# Patient Record
Sex: FEMALE | Race: WHITE | NOT HISPANIC OR LATINO | URBAN - METROPOLITAN AREA
[De-identification: names, ages, dates, MRNs, and addresses within clinical notes are randomized per-mention and may not be internally consistent; named-entity substitution may affect disease eponyms.]

---

## 2017-07-25 ENCOUNTER — APPOINTMENT (OUTPATIENT)
Dept: URBAN - METROPOLITAN AREA CLINIC 219 | Age: 77
Setting detail: DERMATOLOGY
End: 2017-07-31

## 2017-07-25 DIAGNOSIS — L30.1 DYSHIDROSIS [POMPHOLYX]: ICD-10-CM

## 2017-07-25 DIAGNOSIS — Z80.8 FAMILY HISTORY OF MALIGNANT NEOPLASM OF OTHER ORGANS OR SYSTEMS: ICD-10-CM

## 2017-07-25 DIAGNOSIS — L30.4 ERYTHEMA INTERTRIGO: ICD-10-CM

## 2017-07-25 DIAGNOSIS — L82.1 OTHER SEBORRHEIC KERATOSIS: ICD-10-CM

## 2017-07-25 DIAGNOSIS — L57.0 ACTINIC KERATOSIS: ICD-10-CM

## 2017-07-25 PROBLEM — D48.5 NEOPLASM OF UNCERTAIN BEHAVIOR OF SKIN: Status: ACTIVE | Noted: 2017-07-25

## 2017-07-25 PROBLEM — L85.3 XEROSIS CUTIS: Status: ACTIVE | Noted: 2017-07-25

## 2017-07-25 PROCEDURE — OTHER TREATMENT REGIMEN: OTHER

## 2017-07-25 PROCEDURE — OTHER REASSURANCE: OTHER

## 2017-07-25 PROCEDURE — OTHER LIQUID NITROGEN: OTHER

## 2017-07-25 PROCEDURE — OTHER COUNSELING: OTHER

## 2017-07-25 PROCEDURE — OTHER BIOPSY BY SHAVE METHOD: OTHER

## 2017-07-25 PROCEDURE — 11100: CPT | Mod: 59

## 2017-07-25 PROCEDURE — 99213 OFFICE O/P EST LOW 20 MIN: CPT | Mod: 25

## 2017-07-25 PROCEDURE — 17000 DESTRUCT PREMALG LESION: CPT

## 2017-07-25 ASSESSMENT — LOCATION SIMPLE DESCRIPTION DERM
LOCATION SIMPLE: LEFT PRETIBIAL REGION
LOCATION SIMPLE: RIGHT TEMPLE

## 2017-07-25 ASSESSMENT — LOCATION DETAILED DESCRIPTION DERM
LOCATION DETAILED: RIGHT MEDIAL TEMPLE
LOCATION DETAILED: LEFT PROXIMAL PRETIBIAL REGION

## 2017-07-25 ASSESSMENT — LOCATION ZONE DERM
LOCATION ZONE: FACE
LOCATION ZONE: LEG

## 2017-07-25 NOTE — PROCEDURE: LIQUID NITROGEN
Post-Care Instructions: I reviewed with the patient in detail post-care instructions. Patient is to wear sunprotection, and avoid picking at any of the treated lesions. Pt may apply Vaseline to crusted or scabbing areas.
Consent: The patient's consent was obtained including but not limited to risks of crusting, scabbing, blistering, scarring, darker or lighter pigmentary change, recurrence, incomplete removal and infection.
Detail Level: Detailed
Number Of Freeze-Thaw Cycles: 1 freeze-thaw cycle
Duration Of Freeze Thaw-Cycle (Seconds): 0
Render Post-Care Instructions In Note?: no

## 2017-07-25 NOTE — PROCEDURE: BIOPSY BY SHAVE METHOD
Anesthesia Volume In Cc (Will Not Render If 0): 1
Post-Care Instructions: I reviewed with the patient in detail post-care instructions. Patient is to keep the biopsy site dry overnight, and then apply bacitracin twice daily until healed. Patient may apply hydrogen peroxide soaks to remove any crusting.
Electrodesiccation And Curettage Text: The wound bed was treated with electrodesiccation and curettage after the biopsy was performed.
Destruction After The Procedure: No
Silver Nitrate Text: The wound bed was treated with silver nitrate after the biopsy was performed.
Hemostasis: Aluminum Chloride
Consent: Written consent was obtained and risks were reviewed including but not limited to scarring, infection, bleeding, scabbing, incomplete removal, nerve damage and allergy to anesthesia.
Detail Level: Detailed
Body Location Override (Optional - Billing Will Still Be Based On Selected Body Map Location If Applicable): right posterior shoulder
Type Of Destruction Used: Curettage
Anesthesia Type: 1% lidocaine with epinephrine and a 1:10 solution of 8.4% sodium bicarbonate
Size Of Lesion In Cm: 1.2
Biopsy Method: Personna blade
Curettage Text: The wound bed was treated with curettage after the biopsy was performed.
X Size Of Lesion In Cm: 0
Biopsy Type: H and E
Cryotherapy Text: The wound bed was treated with cryotherapy after the biopsy was performed.
Electrodesiccation Text: The wound bed was treated with electrodesiccation after the biopsy was performed.
Anticipated Plan (Based On Presumed Biopsy Results): If Basal Cell Carcinoma+ plan [id number]
Dressing: pressure dressing with telfa
Billing Type: Third-Party Bill
Notification Instructions: Patient will be notified of biopsy results. However, patient instructed to call the office if not contacted within 2 weeks.
Wound Care: Polysporin ointment

## 2017-07-25 NOTE — PROCEDURE: TREATMENT REGIMEN
Detail Level: Detailed
Continue Regimen: Clobetasol Cream twice a day as needed for flares\\nEmollients \\nMild Cleansers\\nO'Keefes Working Hands three times a day\\nNexcare Crack Solution as needed
Continue Regimen: Ciclopirox TS twice a day as needed \\nZeasorb powder\\nDrying technique

## 2017-10-30 ENCOUNTER — APPOINTMENT (OUTPATIENT)
Dept: URBAN - METROPOLITAN AREA CLINIC 219 | Age: 77
Setting detail: DERMATOLOGY
End: 2017-10-30

## 2017-10-30 DIAGNOSIS — L30.1 DYSHIDROSIS [POMPHOLYX]: ICD-10-CM

## 2017-10-30 DIAGNOSIS — L82.1 OTHER SEBORRHEIC KERATOSIS: ICD-10-CM

## 2017-10-30 DIAGNOSIS — L30.4 ERYTHEMA INTERTRIGO: ICD-10-CM

## 2017-10-30 DIAGNOSIS — R23.3 SPONTANEOUS ECCHYMOSES: ICD-10-CM

## 2017-10-30 DIAGNOSIS — L44.8 OTHER SPECIFIED PAPULOSQUAMOUS DISORDERS: ICD-10-CM

## 2017-10-30 DIAGNOSIS — L73.8 OTHER SPECIFIED FOLLICULAR DISORDERS: ICD-10-CM

## 2017-10-30 DIAGNOSIS — L29.8 OTHER PRURITUS: ICD-10-CM

## 2017-10-30 DIAGNOSIS — Z80.8 FAMILY HISTORY OF MALIGNANT NEOPLASM OF OTHER ORGANS OR SYSTEMS: ICD-10-CM

## 2017-10-30 PROBLEM — I10 ESSENTIAL (PRIMARY) HYPERTENSION: Status: ACTIVE | Noted: 2017-10-30

## 2017-10-30 PROBLEM — L20.84 INTRINSIC (ALLERGIC) ECZEMA: Status: ACTIVE | Noted: 2017-10-30

## 2017-10-30 PROBLEM — M12.9 ARTHROPATHY, UNSPECIFIED: Status: ACTIVE | Noted: 2017-10-30

## 2017-10-30 PROCEDURE — OTHER MIPS QUALITY: OTHER

## 2017-10-30 PROCEDURE — OTHER COUNSELING: OTHER

## 2017-10-30 PROCEDURE — OTHER TREATMENT REGIMEN: OTHER

## 2017-10-30 PROCEDURE — 99213 OFFICE O/P EST LOW 20 MIN: CPT

## 2017-10-30 PROCEDURE — OTHER REASSURANCE: OTHER

## 2017-10-30 ASSESSMENT — LOCATION DETAILED DESCRIPTION DERM
LOCATION DETAILED: LEFT INFERIOR MEDIAL FOREHEAD
LOCATION DETAILED: RIGHT PROXIMAL PRETIBIAL REGION
LOCATION DETAILED: RIGHT PROXIMAL RADIAL DORSAL FOREARM
LOCATION DETAILED: LEFT PROXIMAL RADIAL DORSAL FOREARM
LOCATION DETAILED: RIGHT MEDIAL TEMPLE
LOCATION DETAILED: RIGHT POSTERIOR SHOULDER

## 2017-10-30 ASSESSMENT — LOCATION SIMPLE DESCRIPTION DERM
LOCATION SIMPLE: RIGHT TEMPLE
LOCATION SIMPLE: LEFT FOREARM
LOCATION SIMPLE: RIGHT SHOULDER
LOCATION SIMPLE: LEFT FOREHEAD
LOCATION SIMPLE: RIGHT PRETIBIAL REGION
LOCATION SIMPLE: RIGHT FOREARM

## 2017-10-30 ASSESSMENT — LOCATION ZONE DERM
LOCATION ZONE: ARM
LOCATION ZONE: LEG
LOCATION ZONE: FACE

## 2017-10-30 NOTE — PROCEDURE: TREATMENT REGIMEN
Plan: OTC Sarna Lotion as needed for itching
Detail Level: Detailed
Detail Level: Simple
Continue Regimen: Clobetasol Cream twice a day as needed for flares\\nEmollients \\nMild Cleansers\\nO'Keefes Working Hands three times a day\\nNexcare Crack Solution as needed
Continue Regimen: Ciclopirox TS twice a day as needed \\nZeasorb powder\\nDrying technique

## 2018-04-24 ENCOUNTER — APPOINTMENT (OUTPATIENT)
Dept: URBAN - METROPOLITAN AREA CLINIC 219 | Age: 78
Setting detail: DERMATOLOGY
End: 2018-04-29

## 2018-04-24 DIAGNOSIS — L30.8 OTHER SPECIFIED DERMATITIS: ICD-10-CM

## 2018-04-24 PROBLEM — L30.9 DERMATITIS, UNSPECIFIED: Status: ACTIVE | Noted: 2018-04-24

## 2018-04-24 PROCEDURE — OTHER PRESCRIPTION: OTHER

## 2018-04-24 PROCEDURE — 11100: CPT

## 2018-04-24 PROCEDURE — OTHER BIOPSY BY PUNCH METHOD: OTHER

## 2018-04-24 PROCEDURE — 99214 OFFICE O/P EST MOD 30 MIN: CPT | Mod: 25

## 2018-04-24 PROCEDURE — OTHER TREATMENT REGIMEN: OTHER

## 2018-04-24 RX ORDER — PREDNISONE 10 MG/1
1 TABLET ORAL AS DIRECTED
Qty: 35 | Refills: 0 | Status: ERX

## 2018-04-24 RX ORDER — FEXOFENADINE 180 MG/1
1 TABLET, FILM COATED ORAL QAM
Qty: 30 | Refills: 3 | Status: ERX

## 2018-04-24 RX ORDER — LEVOCETIRIZINE DIHYDROCHLORIDE 5 MG/1
1 TABLET, FILM COATED ORAL EVERY NIGHT
Qty: 30 | Refills: 3 | Status: ERX

## 2018-04-24 RX ORDER — TRIAMCINOLONE ACETONIDE 1 MG/G
APPLY CREAM TOPICAL TWICE A DAY
Qty: 1 | Refills: 2 | Status: ERX

## 2018-04-24 RX ORDER — MUPIROCIN 20 MG/G
APPLY OINTMENT TOPICAL AS NEEDED
Qty: 1 | Refills: 5 | Status: ERX

## 2018-04-24 ASSESSMENT — LOCATION SIMPLE DESCRIPTION DERM
LOCATION SIMPLE: LEFT FOREARM
LOCATION SIMPLE: RIGHT THIGH
LOCATION SIMPLE: RIGHT PRETIBIAL REGION
LOCATION SIMPLE: ABDOMEN
LOCATION SIMPLE: LEFT THIGH
LOCATION SIMPLE: RIGHT FOREARM
LOCATION SIMPLE: LEFT PRETIBIAL REGION

## 2018-04-24 ASSESSMENT — LOCATION DETAILED DESCRIPTION DERM
LOCATION DETAILED: PERIUMBILICAL SKIN
LOCATION DETAILED: RIGHT PROXIMAL PRETIBIAL REGION
LOCATION DETAILED: RIGHT VENTRAL PROXIMAL FOREARM
LOCATION DETAILED: RIGHT ANTERIOR PROXIMAL THIGH
LOCATION DETAILED: LEFT VENTRAL PROXIMAL FOREARM
LOCATION DETAILED: LEFT PROXIMAL PRETIBIAL REGION
LOCATION DETAILED: LEFT ANTERIOR PROXIMAL THIGH

## 2018-04-24 ASSESSMENT — LOCATION ZONE DERM
LOCATION ZONE: TRUNK
LOCATION ZONE: ARM
LOCATION ZONE: LEG

## 2018-04-24 NOTE — PROCEDURE: BIOPSY BY PUNCH METHOD
Patient Will Remove Sutures At Home?: No
Body Location Override (Optional - Billing Will Still Be Based On Selected Body Map Location If Applicable): right abdomen
Suture Removal: 10 days
Anesthesia Volume In Cc (Will Not Render If 0): 0.3
Wound Care: Polysporin ointment
Punch Size In Mm: 4
Post-Care Instructions: I reviewed with the patient in detail post-care instructions. Patient is to keep the biopsy site dry overnight, and then apply bacitracin twice daily until healed. Patient may apply hydrogen peroxide soaks to remove any crusting.
Additional Anesthesia Volume In Cc (Will Not Render If 0): 0
Anesthesia Type: 1% lidocaine with epinephrine and a 1:10 solution of 8.4% sodium bicarbonate
Dressing: pressure dressing with telfa
Consent: Written consent was obtained and risks were reviewed including but not limited to scarring, infection, bleeding, scabbing, incomplete removal, nerve damage and allergy to anesthesia.
Home Suture Removal Text: Patient was provided a home suture removal kit and will remove their sutures at home.  If they have any questions or difficulties they will call the office.
Detail Level: Detailed
Notification Instructions: Patient will be notified of biopsy results. However, patient instructed to call the office if not contacted within 2 weeks.
Hemostasis: None
Billing Type: Third-Party Bill
Epidermal Sutures: 5-0 Prolene
Was A Bandage Applied: Yes
Biopsy Type: H and E

## 2018-04-24 NOTE — PROCEDURE: TREATMENT REGIMEN
Detail Level: Simple
Plan: Triamcinolone Cream twice a day as needed to worse areas ( avoid face/armpits/groin ) \\nFexofenadine 180 mg every morning \\nLevocetirizine 5 mg at night ( sedation warning ) \\nPrednisone Taper 40/20/10 x15 days ( then after that resume her 5 mg prednisone for arthritis) \\nCont Nexium in am \\nPepcid 20 mg at night \\nAvoid getting overheated and taking hot showers\\nRequest recent blood work from Dr. Alen Osei

## 2018-05-04 ENCOUNTER — APPOINTMENT (OUTPATIENT)
Dept: URBAN - METROPOLITAN AREA CLINIC 219 | Age: 78
Setting detail: DERMATOLOGY
End: 2018-05-04

## 2018-05-04 DIAGNOSIS — L50.1 IDIOPATHIC URTICARIA: ICD-10-CM

## 2018-05-04 PROCEDURE — 99213 OFFICE O/P EST LOW 20 MIN: CPT

## 2018-05-04 PROCEDURE — OTHER SUTURE REMOVAL (GLOBAL PERIOD): OTHER

## 2018-05-04 PROCEDURE — OTHER TREATMENT REGIMEN: OTHER

## 2018-05-04 ASSESSMENT — LOCATION SIMPLE DESCRIPTION DERM
LOCATION SIMPLE: LEFT FOREARM
LOCATION SIMPLE: RIGHT THIGH
LOCATION SIMPLE: RIGHT FOREARM
LOCATION SIMPLE: RIGHT PRETIBIAL REGION
LOCATION SIMPLE: ABDOMEN
LOCATION SIMPLE: LEFT PRETIBIAL REGION
LOCATION SIMPLE: LEFT THIGH

## 2018-05-04 ASSESSMENT — LOCATION ZONE DERM
LOCATION ZONE: ARM
LOCATION ZONE: LEG
LOCATION ZONE: TRUNK

## 2018-05-04 ASSESSMENT — LOCATION DETAILED DESCRIPTION DERM
LOCATION DETAILED: PERIUMBILICAL SKIN
LOCATION DETAILED: RIGHT ANTERIOR PROXIMAL THIGH
LOCATION DETAILED: RIGHT PROXIMAL PRETIBIAL REGION
LOCATION DETAILED: LEFT ANTERIOR PROXIMAL THIGH
LOCATION DETAILED: RIGHT VENTRAL PROXIMAL FOREARM
LOCATION DETAILED: LEFT PROXIMAL PRETIBIAL REGION
LOCATION DETAILED: LEFT VENTRAL PROXIMAL FOREARM

## 2018-05-04 NOTE — PROCEDURE: TREATMENT REGIMEN
Continue Regimen: Triamcinolone Cream twice a day as needed to worst areas ( avoid face/armpits/groin ) \\nFexofenadine 180 mg every morning - continue for a full month and then take as needed\\nLevocetirizine 5 mg at night - continue for a full month and then take as needed( sedation warning ) \\nComplete Prednisone Taper\\nContinue Nexium in the morning \\nPepcid 20 mg at night while on prednisone \\nAvoid getting overheated and taking hot showers
Detail Level: Simple

## 2018-05-04 NOTE — PROCEDURE: SUTURE REMOVAL (GLOBAL PERIOD)
Detail Level: Detailed
Body Location Override (Optional - Billing Will Still Be Based On Selected Body Map Location If Applicable): right abdomen
Add 14093 Cpt? (Important Note: In 2017 The Use Of 80255 Is Being Tracked By Cms To Determine Future Global Period Reimbursement For Global Periods): no

## 2018-11-05 ENCOUNTER — APPOINTMENT (OUTPATIENT)
Dept: URBAN - METROPOLITAN AREA CLINIC 219 | Age: 78
Setting detail: DERMATOLOGY
End: 2018-11-05

## 2018-11-05 DIAGNOSIS — L72.0 EPIDERMAL CYST: ICD-10-CM

## 2018-11-05 DIAGNOSIS — L82.1 OTHER SEBORRHEIC KERATOSIS: ICD-10-CM

## 2018-11-05 DIAGNOSIS — B07.8 OTHER VIRAL WARTS: ICD-10-CM

## 2018-11-05 DIAGNOSIS — L30.1 DYSHIDROSIS [POMPHOLYX]: ICD-10-CM

## 2018-11-05 DIAGNOSIS — Z78.9 OTHER SPECIFIED HEALTH STATUS: ICD-10-CM

## 2018-11-05 DIAGNOSIS — L30.4 ERYTHEMA INTERTRIGO: ICD-10-CM

## 2018-11-05 DIAGNOSIS — L73.8 OTHER SPECIFIED FOLLICULAR DISORDERS: ICD-10-CM

## 2018-11-05 DIAGNOSIS — Z80.8 FAMILY HISTORY OF MALIGNANT NEOPLASM OF OTHER ORGANS OR SYSTEMS: ICD-10-CM

## 2018-11-05 PROBLEM — H91.90 UNSPECIFIED HEARING LOSS, UNSPECIFIED EAR: Status: ACTIVE | Noted: 2018-11-05

## 2018-11-05 PROBLEM — D23.72 OTHER BENIGN NEOPLASM OF SKIN OF LEFT LOWER LIMB, INCLUDING HIP: Status: ACTIVE | Noted: 2018-11-05

## 2018-11-05 PROBLEM — I10 ESSENTIAL (PRIMARY) HYPERTENSION: Status: ACTIVE | Noted: 2018-11-05

## 2018-11-05 PROBLEM — L23.7 ALLERGIC CONTACT DERMATITIS DUE TO PLANTS, EXCEPT FOOD: Status: ACTIVE | Noted: 2018-11-05

## 2018-11-05 PROBLEM — J45.909 UNSPECIFIED ASTHMA, UNCOMPLICATED: Status: ACTIVE | Noted: 2018-11-05

## 2018-11-05 PROBLEM — E78.5 HYPERLIPIDEMIA, UNSPECIFIED: Status: ACTIVE | Noted: 2018-11-05

## 2018-11-05 PROBLEM — K21.9 GASTRO-ESOPHAGEAL REFLUX DISEASE WITHOUT ESOPHAGITIS: Status: ACTIVE | Noted: 2018-11-05

## 2018-11-05 PROCEDURE — 99214 OFFICE O/P EST MOD 30 MIN: CPT | Mod: 25

## 2018-11-05 PROCEDURE — OTHER REASSURANCE: OTHER

## 2018-11-05 PROCEDURE — 17110 DESTRUCT B9 LESION 1-14: CPT

## 2018-11-05 PROCEDURE — OTHER LIQUID NITROGEN: OTHER

## 2018-11-05 PROCEDURE — OTHER TREATMENT REGIMEN: OTHER

## 2018-11-05 PROCEDURE — OTHER MIPS QUALITY: OTHER

## 2018-11-05 PROCEDURE — OTHER COUNSELING: OTHER

## 2018-11-05 ASSESSMENT — LOCATION DETAILED DESCRIPTION DERM
LOCATION DETAILED: RIGHT DISTAL POSTERIOR THIGH
LOCATION DETAILED: UPPER STERNUM
LOCATION DETAILED: LEFT INFERIOR MEDIAL FOREHEAD
LOCATION DETAILED: RIGHT MEDIAL ZYGOMA
LOCATION DETAILED: RIGHT SUPERIOR MEDIAL UPPER BACK
LOCATION DETAILED: LEFT ANTERIOR DISTAL THIGH

## 2018-11-05 ASSESSMENT — LOCATION ZONE DERM
LOCATION ZONE: FACE
LOCATION ZONE: TRUNK
LOCATION ZONE: LEG

## 2018-11-05 ASSESSMENT — LOCATION SIMPLE DESCRIPTION DERM
LOCATION SIMPLE: RIGHT ZYGOMA
LOCATION SIMPLE: CHEST
LOCATION SIMPLE: LEFT THIGH
LOCATION SIMPLE: RIGHT POSTERIOR THIGH
LOCATION SIMPLE: LEFT FOREHEAD
LOCATION SIMPLE: RIGHT UPPER BACK

## 2018-11-05 NOTE — HPI: OTHER
Condition:: Patient reports 3 episodes of difficulty breathing
Please Describe Your Condition:: comes in for a chief complaint of Patient reports 3 episodes of difficulty breathing. Resolved spontaneously after effort. Patient taking Zyrtec daily per order from her PCP

## 2018-11-05 NOTE — PROCEDURE: LIQUID NITROGEN
Detail Level: Detailed
Render Post-Care Instructions In Note?: no
Post-Care Instructions: I reviewed with the patient in detail post-care instructions. Patient is to wear sunprotection, and avoid picking at any of the treated lesions. Pt may apply Vaseline to crusted or scabbing areas.
Medical Necessity Clause: This procedure was medically necessary because the lesions that were treated were:
Number Of Freeze-Thaw Cycles: 1 freeze-thaw cycle
Consent: The patient's consent was obtained including but not limited to risks of crusting, scabbing, blistering, scarring, darker or lighter pigmentary change, recurrence, incomplete removal and infection.
Medical Necessity Information: It is in your best interest to select a reason for this procedure from the list below. All of these items fulfill various CMS LCD requirements except the new and changing color options.

## 2018-11-05 NOTE — PROCEDURE: TREATMENT REGIMEN
Continue Regimen: Ciclopirox TS twice a day as needed \\nZeasorb powder\\nDrying technique
Detail Level: Detailed
Continue Regimen: Clobetasol Cream twice a day as needed for flares\\nEmollients \\nMild Cleansers\\nO'Keefes Working Hands three times a day\\nNexcare Crack Solution as needed
Detail Level: Simple
Plan: Will refer to ENT (Dr. Og or [facility]) for evaluation and management\\nContinue OTC Zyrtec once a day

## 2018-11-20 ENCOUNTER — APPOINTMENT (OUTPATIENT)
Dept: URBAN - METROPOLITAN AREA CLINIC 219 | Age: 78
Setting detail: DERMATOLOGY
End: 2018-11-20

## 2018-11-20 DIAGNOSIS — Z78.9 OTHER SPECIFIED HEALTH STATUS: ICD-10-CM

## 2018-11-20 PROCEDURE — OTHER REFERRAL: OTHER

## 2018-11-20 ASSESSMENT — LOCATION DETAILED DESCRIPTION DERM: LOCATION DETAILED: LEFT CENTRAL ANTERIOR NECK

## 2018-11-20 ASSESSMENT — LOCATION ZONE DERM: LOCATION ZONE: NECK

## 2018-11-20 ASSESSMENT — LOCATION SIMPLE DESCRIPTION DERM: LOCATION SIMPLE: NECK

## 2019-02-20 ENCOUNTER — APPOINTMENT (OUTPATIENT)
Dept: URBAN - METROPOLITAN AREA CLINIC 219 | Age: 79
Setting detail: DERMATOLOGY
End: 2019-02-20

## 2019-02-20 DIAGNOSIS — Z80.8 FAMILY HISTORY OF MALIGNANT NEOPLASM OF OTHER ORGANS OR SYSTEMS: ICD-10-CM

## 2019-02-20 DIAGNOSIS — L82.1 OTHER SEBORRHEIC KERATOSIS: ICD-10-CM

## 2019-02-20 DIAGNOSIS — L30.4 ERYTHEMA INTERTRIGO: ICD-10-CM

## 2019-02-20 DIAGNOSIS — L72.0 EPIDERMAL CYST: ICD-10-CM

## 2019-02-20 DIAGNOSIS — L30.1 DYSHIDROSIS [POMPHOLYX]: ICD-10-CM

## 2019-02-20 DIAGNOSIS — T07XXXA INSECT BITE, NONVENOMOUS, OF OTHER, MULTIPLE, AND UNSPECIFIED SITES, WITHOUT MENTION OF INFECTION: ICD-10-CM

## 2019-02-20 DIAGNOSIS — L82.0 INFLAMED SEBORRHEIC KERATOSIS: ICD-10-CM

## 2019-02-20 DIAGNOSIS — T78.40 ALLERGY, UNSPECIFIED: ICD-10-CM

## 2019-02-20 PROBLEM — E03.9 HYPOTHYROIDISM, UNSPECIFIED: Status: ACTIVE | Noted: 2019-02-20

## 2019-02-20 PROBLEM — S90.561A INSECT BITE (NONVENOMOUS), RIGHT ANKLE, INITIAL ENCOUNTER: Status: ACTIVE | Noted: 2019-02-20

## 2019-02-20 PROBLEM — T78.40XA ALLERGY, UNSPECIFIED, INITIAL ENCOUNTER: Status: ACTIVE | Noted: 2019-02-20

## 2019-02-20 PROBLEM — D23.72 OTHER BENIGN NEOPLASM OF SKIN OF LEFT LOWER LIMB, INCLUDING HIP: Status: ACTIVE | Noted: 2019-02-20

## 2019-02-20 PROBLEM — H91.90 UNSPECIFIED HEARING LOSS, UNSPECIFIED EAR: Status: ACTIVE | Noted: 2019-02-20

## 2019-02-20 PROCEDURE — OTHER REASSURANCE: OTHER

## 2019-02-20 PROCEDURE — OTHER COUNSELING: OTHER

## 2019-02-20 PROCEDURE — OTHER LIQUID NITROGEN: OTHER

## 2019-02-20 PROCEDURE — 99214 OFFICE O/P EST MOD 30 MIN: CPT | Mod: 25

## 2019-02-20 PROCEDURE — 17110 DESTRUCT B9 LESION 1-14: CPT

## 2019-02-20 PROCEDURE — OTHER PRESCRIPTION: OTHER

## 2019-02-20 PROCEDURE — OTHER TREATMENT REGIMEN: OTHER

## 2019-02-20 PROCEDURE — 10060 I&D ABSCESS SIMPLE/SINGLE: CPT

## 2019-02-20 PROCEDURE — OTHER MIPS QUALITY: OTHER

## 2019-02-20 PROCEDURE — OTHER INCISION AND DRAINAGE: OTHER

## 2019-02-20 RX ORDER — LEVOCETIRIZINE DIHYDROCHLORIDE 5 MG/1
1 TABLET, FILM COATED ORAL ONCE A DAY
Qty: 30 | Refills: 0 | Status: ERX

## 2019-02-20 ASSESSMENT — LOCATION SIMPLE DESCRIPTION DERM
LOCATION SIMPLE: RIGHT POSTERIOR THIGH
LOCATION SIMPLE: RIGHT FOREARM
LOCATION SIMPLE: RIGHT UPPER BACK
LOCATION SIMPLE: RIGHT TEMPLE
LOCATION SIMPLE: LEFT SHOULDER
LOCATION SIMPLE: RIGHT ANKLE

## 2019-02-20 ASSESSMENT — LOCATION DETAILED DESCRIPTION DERM
LOCATION DETAILED: RIGHT INFERIOR UPPER BACK
LOCATION DETAILED: LEFT ANTERIOR SHOULDER
LOCATION DETAILED: RIGHT DISTAL LATERAL POSTERIOR THIGH
LOCATION DETAILED: RIGHT POSTERIOR ANKLE
LOCATION DETAILED: RIGHT CENTRAL TEMPLE
LOCATION DETAILED: RIGHT DISTAL RADIAL DORSAL FOREARM

## 2019-02-20 ASSESSMENT — LOCATION ZONE DERM
LOCATION ZONE: TRUNK
LOCATION ZONE: ARM
LOCATION ZONE: FACE
LOCATION ZONE: LEG

## 2019-02-20 NOTE — PROCEDURE: MIPS QUALITY
Quality 110: Preventive Care And Screening: Influenza Immunization: Influenza Immunization Administered during Influenza season
Detail Level: Detailed
Quality 474: Zoster Vaccination Status: Shingrix vaccination previously received

## 2019-02-20 NOTE — PROCEDURE: LIQUID NITROGEN
Medical Necessity Clause: This procedure was medically necessary because the lesions that were treated were:
Number Of Freeze-Thaw Cycles: 1 freeze-thaw cycle
Render Post-Care Instructions In Note?: no
Post-Care Instructions: I reviewed with the patient in detail post-care instructions. Patient is to wear sunprotection, and avoid picking at any of the treated lesions. Pt may apply Vaseline to crusted or scabbing areas.
Medical Necessity Information: It is in your best interest to select a reason for this procedure from the list below. All of these items fulfill various CMS LCD requirements except the new and changing color options.
Consent: The patient's consent was obtained including but not limited to risks of crusting, scabbing, blistering, scarring, darker or lighter pigmentary change, recurrence, incomplete removal and infection.
Detail Level: Detailed

## 2019-02-20 NOTE — PROCEDURE: TREATMENT REGIMEN
Continue Regimen: Ciclopirox TS twice a day as needed \\nZeasorb powder\\nDrying technique
Detail Level: Simple
Continue Regimen: Clobetasol Cream twice a day as needed for flares\\nEmollients \\nMild Cleansers\\nO'Keefes Working Hands three times a day\\nNexcare Crack Solution as needed
Plan: Clobetasol Cream twice a day as needed
Plan: Clobetasol Cream twice a day as needed \\nlevocetirizine 5 mg at night as needed ( sedation warning)
Detail Level: Detailed

## 2019-04-22 ENCOUNTER — RX ONLY (RX ONLY)
Age: 79
End: 2019-04-22

## 2019-04-22 RX ORDER — CLOBETASOL PROPIONATE 0.5 MG/G
APPLY CREAM TOPICAL
Qty: 1 | Refills: 2 | Status: CANCELLED
Stop reason: CLARIF

## 2020-06-15 NOTE — PROCEDURE: INCISION AND DRAINAGE
Epidermal Sutures: 4-0 Ethilon
Detail Level: Detailed
Curette Text (Optional): After the contents were expressed a curette was used to partially remove the cyst wall.
Include Sutures?: No
Dressing: telfa dressing
Anesthesia Volume In Cc: 0.3
Wound Care: Mupirocin
Lesion Type: Cyst
Anesthesia Type: 1% lidocaine with epinephrine and a 1:10 solution of 8.4% sodium bicarbonate
Post-Care Instructions: I reviewed with the patient in detail post-care instructions. Patient should keep wound covered and call the office should any redness, pain, swelling or worsening occur.
Epidermal Closure: simple interrupted
Preparation Text: The area was prepped in the usual clean fashion.
Suture Text: The incision was partially closed with
Body Location Override (Optional - Billing Will Still Be Based On Selected Body Map Location If Applicable): right posterior thigh
Method: 11 blade
Consent was obtained and risks were reviewed including but not limited to delayed wound healing, infection, need for multiple I and D's, and pain.
Size Of Lesion In Cm (Optional But May Be Required For Some Insurances): 0.7
done

## 2020-09-15 ENCOUNTER — OFFICE VISIT (OUTPATIENT)
Dept: URBAN - NONMETROPOLITAN AREA CLINIC 2 | Facility: CLINIC | Age: 80
End: 2020-09-15

## 2020-09-28 ENCOUNTER — OFFICE VISIT (OUTPATIENT)
Dept: URBAN - NONMETROPOLITAN AREA CLINIC 2 | Facility: CLINIC | Age: 80
End: 2020-09-28
Payer: MEDICARE

## 2020-09-28 VITALS
HEIGHT: 65 IN | TEMPERATURE: 98 F | BODY MASS INDEX: 25.49 KG/M2 | WEIGHT: 153 LBS | HEART RATE: 84 BPM | SYSTOLIC BLOOD PRESSURE: 164 MMHG | DIASTOLIC BLOOD PRESSURE: 84 MMHG

## 2020-09-28 DIAGNOSIS — K31.84 GASTROPARESIS: ICD-10-CM

## 2020-09-28 DIAGNOSIS — R19.7 DIARRHEA: ICD-10-CM

## 2020-09-28 DIAGNOSIS — R14.0 ABDOMINAL BLOATING: ICD-10-CM

## 2020-09-28 DIAGNOSIS — D12.6 COLON ADENOMA: ICD-10-CM

## 2020-09-28 DIAGNOSIS — I70.1 RENAL ARTERY STENOSIS: ICD-10-CM

## 2020-09-28 PROCEDURE — 1036F TOBACCO NON-USER: CPT | Performed by: INTERNAL MEDICINE

## 2020-09-28 PROCEDURE — G8417 CALC BMI ABV UP PARAM F/U: HCPCS | Performed by: INTERNAL MEDICINE

## 2020-09-28 PROCEDURE — 99205 OFFICE O/P NEW HI 60 MIN: CPT | Performed by: INTERNAL MEDICINE

## 2020-09-28 PROCEDURE — 87493 C DIFF AMPLIFIED PROBE: CPT | Performed by: INTERNAL MEDICINE

## 2020-09-28 PROCEDURE — G8427 DOCREV CUR MEDS BY ELIG CLIN: HCPCS | Performed by: INTERNAL MEDICINE

## 2020-09-28 RX ORDER — SODIUM, POTASSIUM,MAG SULFATES 17.5-3.13G
354 ML SOLUTION, RECONSTITUTED, ORAL ORAL ONCE
Qty: 354 MILLILITER | Refills: 0 | OUTPATIENT
Start: 2020-09-28 | End: 2020-09-29

## 2020-09-28 NOTE — PHYSICAL EXAM HENT:
Head,  normocephalic,  atraumatic,  Face,  Face within normal limits,  Ears,  External ears within normal limits,  Nose/Nasopharynx,  External nose  normal appearance,  nares patent,  no nasal discharge,  Mouth and Throat,  Oral cavity appearance normal Lips,  Appearance normal

## 2020-09-28 NOTE — PHYSICAL EXAM GASTROINTESTINAL
Abdomen , soft, nontender, nondistended , no guarding or rigidity , no masses palpable , hyperactive bowel sounds, Liver and Spleen , no hepatomegaly present , no hepatosplenomegaly , liver nontender , spleen not palpable

## 2020-09-28 NOTE — HPI-TODAY'S VISIT:
Ms. Lujan is a 79 year old female with past medical history of total abdominal hysterectomy (). cholecystectomy, renal artery stenosis s/p stent currently on clopidogrel, gastroparesis diagnosed in  based on gastric emptying study, history of lymphocytic colitis diagnosed based on flexible sigmoidoscopy in  who presents for evaluation of diarrhea.    Ms. Lujan had a history of diarrhea dating back to  at which point she was diagnosed with lymphocytic colitis. She was treated with budesonide with improvement of his symptoms. She did not experience significant diarrhea for over several years. Two months prior to her visit, she has noted a change in her bowel habits. She experiences increased frequency of bowel movements which she describes as loose. She received rifaximin with her PCP which led to mild improvement of symptoms.   She was subsequently diagnosed with cystitis and was prescribed amoxicillin-clavulanic acid which led to exacerbation of diarrhea. Since that time, she has had fecal urgency as well as rectal pressure. She denies weight loss, nausea, vomiting.   Prior Laboratory Evaluation: 2020: CBC normal, chemistry panel normal, LFTsn normal.   Prior Endoscopic Evaluation:  2006: Colonoscopy  Diverticulosis  2008: EGD 1. Small hiatal hernia. 2. Multiple gastric polyps.  2011: Colonoscopy  Colonic mucosa was unremarkable except for a diminutive polyp in the area of the hepatic flexure. This was removed with cold forceps biopsy. Sigmoid diverticulosis.   2011: Pathology from Colonoscopy  Hepatic Flexure: Tubular adenoma  2012: Flexible Sigmoidoscopy The sigmoidoscopy examination was completely normal. Multiple biopsies were taken.   2012: Pathology from Flexible Sigmoidoscopy  Colon mucsoa with mild chronic inflammation and intraepithelial lymphocytosis, consistent with lymphocytic colitis.   Prior Imagin2006: Upper GI Series Impression: 1. No hiatal hernia demonstrated but a small amount of esophgeal reflux occurred during the exam.  2. No evidence of ulcer disease or malignancy.  2007: Abdominal Ultrasound Impression: Normal right upper quadrant ultrasound.  2008: Gastric Emptying Study Impression: 1. Prolonged gastric emptying.  2. The stomach is not particularly distended in appearance and there is small bowel activity increasing over the course of the examination. This would argue against gastric outlet obstruction and would raise the possiblity of abnormal motility.   2009: MRCP Impression: Prominent common bile duct is within normal limits considering prior cholecystectomy. There is no filling defect in the duct or lesion in the head of the pancreas.   10/16/2009: Abdominal Ultrasound 1. Normal response of the common bile duct following fatty meal. 2. Tiny hepatic and right renal cysts.

## 2020-09-30 LAB
A/G RATIO: 1.9
ALBUMIN: 4.9
ALKALINE PHOSPHATASE: 75
ALT (SGPT): 11
AST (SGOT): 18
BASO (ABSOLUTE): 0.1
BASOS: 1
BILIRUBIN, TOTAL: 0.2
BUN/CREATININE RATIO: 21
BUN: 21
CALCIUM: 9.7
CARBON DIOXIDE, TOTAL: 22
CHLORIDE: 94
CREATININE: 1.01
EGFR IF AFRICN AM: 62
EGFR IF NONAFRICN AM: 53
ENDOMYSIAL ANTIBODY IGA: NEGATIVE
EOS (ABSOLUTE): 0.5
EOS: 8
GLOBULIN, TOTAL: 2.6
GLUCOSE: 78
HEMATOCRIT: 37.3
HEMATOLOGY COMMENTS:: (no result)
HEMOGLOBIN: 13
IMMATURE CELLS: (no result)
IMMATURE GRANS (ABS): 0
IMMATURE GRANULOCYTES: 0
IMMUNOGLOBULIN A, QN, SERUM: 58
LYMPHS (ABSOLUTE): 1.4
LYMPHS: 23
MCH: 32.3
MCHC: 34.9
MCV: 93
MONOCYTES(ABSOLUTE): 0.5
MONOCYTES: 8
NEUTROPHILS (ABSOLUTE): 3.6
NEUTROPHILS: 60
NRBC: (no result)
PLATELETS: 310
POTASSIUM: 3.8
PROTEIN, TOTAL: 7.5
RBC: 4.02
RDW: 12.2
SODIUM: 134
T-TRANSGLUTAMINASE (TTG) IGA: <2
T-TRANSGLUTAMINASE (TTG) IGG: 2
WBC: 6

## 2020-10-10 LAB
CAMPYLOBACTER CULTURE: (no result)
CRYPTOSPORIDIUM EIA: NEGATIVE
E COLI SHIGA TOXIN EIA: NEGATIVE
GIARDIA LAMBLIA AG, EIA: NEGATIVE
Lab: (no result)
Lab: (no result)
REQUEST PROBLEM: (no result)
SALMONELLA/SHIGELLA SCREEN: (no result)

## 2020-12-30 ENCOUNTER — TELEPHONE ENCOUNTER (OUTPATIENT)
Dept: URBAN - NONMETROPOLITAN AREA CLINIC 2 | Facility: CLINIC | Age: 80
End: 2020-12-30

## 2020-12-30 RX ORDER — SODIUM, POTASSIUM,MAG SULFATES 17.5-3.13G
354 ML SOLUTION, RECONSTITUTED, ORAL ORAL ONCE
Refills: 0
Start: 2020-09-28 | End: 2020-09-29

## 2021-01-05 ENCOUNTER — TELEPHONE ENCOUNTER (OUTPATIENT)
Dept: URBAN - NONMETROPOLITAN AREA CLINIC 2 | Facility: CLINIC | Age: 81
End: 2021-01-05

## 2021-01-05 RX ORDER — SODIUM, POTASSIUM,MAG SULFATES 17.5-3.13G
354 ML SOLUTION, RECONSTITUTED, ORAL ORAL ONCE
Qty: 354 MILLILITER | Refills: 0 | OUTPATIENT
Start: 2021-01-05 | End: 2021-01-06

## 2021-01-07 ENCOUNTER — CLAIMS CREATED FROM THE CLAIM WINDOW (OUTPATIENT)
Dept: URBAN - METROPOLITAN AREA CLINIC 4 | Facility: CLINIC | Age: 81
End: 2021-01-07
Payer: MEDICARE

## 2021-01-07 ENCOUNTER — OFFICE VISIT (OUTPATIENT)
Dept: URBAN - NONMETROPOLITAN AREA SURGERY CENTER 1 | Facility: SURGERY CENTER | Age: 81
End: 2021-01-07
Payer: MEDICARE

## 2021-01-07 DIAGNOSIS — R19.7 ACUTE DIARRHEA: ICD-10-CM

## 2021-01-07 DIAGNOSIS — D12.3 ADENOMA OF TRANSVERSE COLON: ICD-10-CM

## 2021-01-07 DIAGNOSIS — D12.4 BENIGN NEOPLASM OF DESCENDING COLON: ICD-10-CM

## 2021-01-07 DIAGNOSIS — K63.89 OTHER SPECIFIED DISEASES OF INTESTINE: ICD-10-CM

## 2021-01-07 DIAGNOSIS — D12.5 ADENOMA OF SIGMOID COLON: ICD-10-CM

## 2021-01-07 DIAGNOSIS — K31.7 BENIGN GASTRIC POLYP: ICD-10-CM

## 2021-01-07 DIAGNOSIS — D12.5 BENIGN NEOPLASM OF SIGMOID COLON: ICD-10-CM

## 2021-01-07 DIAGNOSIS — D12.4 ADENOMA OF DESCENDING COLON: ICD-10-CM

## 2021-01-07 DIAGNOSIS — K31.89 OTHER DISEASES OF STOMACH AND DUODENUM: ICD-10-CM

## 2021-01-07 DIAGNOSIS — D12.3 BENIGN NEOPLASM OF TRANSVERSE COLON: ICD-10-CM

## 2021-01-07 DIAGNOSIS — K31.7 POLYP OF STOMACH AND DUODENUM: ICD-10-CM

## 2021-01-07 PROCEDURE — 88312 SPECIAL STAINS GROUP 1: CPT | Performed by: PATHOLOGY

## 2021-01-07 PROCEDURE — 88342 IMHCHEM/IMCYTCHM 1ST ANTB: CPT | Performed by: PATHOLOGY

## 2021-01-07 PROCEDURE — 45385 COLONOSCOPY W/LESION REMOVAL: CPT | Performed by: INTERNAL MEDICINE

## 2021-01-07 PROCEDURE — 88305 TISSUE EXAM BY PATHOLOGIST: CPT | Performed by: PATHOLOGY

## 2021-01-07 PROCEDURE — 45380 COLONOSCOPY AND BIOPSY: CPT | Performed by: INTERNAL MEDICINE

## 2021-01-07 PROCEDURE — 43239 EGD BIOPSY SINGLE/MULTIPLE: CPT | Performed by: INTERNAL MEDICINE

## 2021-01-07 PROCEDURE — G9937 DIG OR SURV COLSCO: HCPCS | Performed by: INTERNAL MEDICINE

## 2021-01-07 PROCEDURE — G8907 PT DOC NO EVENTS ON DISCHARG: HCPCS | Performed by: INTERNAL MEDICINE

## 2021-01-25 ENCOUNTER — OFFICE VISIT (OUTPATIENT)
Dept: URBAN - NONMETROPOLITAN AREA CLINIC 2 | Facility: CLINIC | Age: 81
End: 2021-01-25
Payer: MEDICARE

## 2021-01-25 VITALS
HEIGHT: 65 IN | SYSTOLIC BLOOD PRESSURE: 133 MMHG | BODY MASS INDEX: 25.83 KG/M2 | WEIGHT: 155 LBS | TEMPERATURE: 96.8 F | DIASTOLIC BLOOD PRESSURE: 79 MMHG | HEART RATE: 65 BPM

## 2021-01-25 DIAGNOSIS — R14.0 ABDOMINAL BLOATING: ICD-10-CM

## 2021-01-25 DIAGNOSIS — R19.7 DIARRHEA: ICD-10-CM

## 2021-01-25 DIAGNOSIS — I70.1 RENAL ARTERY STENOSIS: ICD-10-CM

## 2021-01-25 DIAGNOSIS — K31.84 GASTROPARESIS: ICD-10-CM

## 2021-01-25 DIAGNOSIS — D80.2 IGA DEFICIENCY: ICD-10-CM

## 2021-01-25 DIAGNOSIS — Z86.010 PERSONAL HISTORY OF COLON POLYPS: ICD-10-CM

## 2021-01-25 PROCEDURE — 1036F TOBACCO NON-USER: CPT | Performed by: INTERNAL MEDICINE

## 2021-01-25 PROCEDURE — G8420 CALC BMI NORM PARAMETERS: HCPCS | Performed by: INTERNAL MEDICINE

## 2021-01-25 PROCEDURE — G8427 DOCREV CUR MEDS BY ELIG CLIN: HCPCS | Performed by: INTERNAL MEDICINE

## 2021-01-25 PROCEDURE — 99213 OFFICE O/P EST LOW 20 MIN: CPT | Performed by: INTERNAL MEDICINE

## 2021-01-25 NOTE — HPI-TODAY'S VISIT:
2020: Initial Gastroenterology Clinic Visit   Ms. Lujan is a 79 year old female with past medical history of total abdominal hysterectomy (), cholecystectomy, renal artery stenosis s/p stent currently on clopidogrel, gastroparesis diagnosed in  based on gastric emptying study, history of lymphocytic colitis diagnosed based on flexible sigmoidoscopy in  who presents for evaluation of diarrhea.    Ms. Lujan had a history of diarrhea dating back to  at which point she was diagnosed with lymphocytic colitis. She was treated with budesonide with improvement of his symptoms. She did not experience significant diarrhea for over several years. Two months prior to her visit, she has noted a change in her bowel habits. She experiences increased frequency of bowel movements which she describes as loose. She received rifaximin with her PCP which led to mild improvement of symptoms.   She was subsequently diagnosed with cystitis and was prescribed amoxicillin-clavulanic acid which led to exacerbation of diarrhea. Since that time, she has had fecal urgency as well as rectal pressure. She denies weight loss, nausea, vomiting.   Prior Laboratory Evaluation: 2020: 2020: CBC, chemistry panel, LFTs normal. Stool culture negative. Giardia negative. TTG-IgA <2, serum IgA low at 58.   Prior Imagin2008: Gastric Emptying Study Impression: 1. Prolonged gastric emptying.  2. The stomach is not particularly distended in appearance and there is small bowel activity increasing over the course of the examination. This would argue against gastric outlet obstruction and would raise the possiblity of abnormal motility.   2009: MRCP Impression: Prominent common bile duct is within normal limits considering prior cholecystectomy. There is no filling defect in the duct or lesion in the head of the pancreas.   10/16/2009: Abdominal Ultrasound 1. Normal response of the common bile duct following fatty meal. 2. Tiny hepatic and right renal cysts.  Prior Endoscopic Evaluation:  2021: EGD The examined esophagus was normal. The Z-line was found 36 cm from the incisors. A small hiatal hernia was present. Multiple 5 to 15 mm sessile polyps with no stigmata of recent bleeding were found in the stomach. Biopsied for sampling.  Patchy erythematous mucosa without bleeding was found in the gastric antrum. Biopsied. Two 2 to 4 mm sessile polyps were found in the gastric body. The polyps were removed with a cold biopsy forceps.  Nodular mucosa was found in the gastric fundus. Biopsied. The cardia and gastric fundus were normal on retroflexion/. The examined duodenum was normal. Biopsied.  2021: Pathology from EGD A.	Duodenum, Biopsy: No significant abnormality. B.	Stomach, Antrum, Biopsy: No significant abnormality. C.	Stomach, Biopsy: Fundic gland polyp. D.	Stomach, Fundus, Biopsy: Fundic gland polyp E.	Stomach, Polypectomy: Fundic gland polyp. 2021: Colonoscopy  Findings: The perianal and digital rectal examinations were normal.  The terminal ileum appeared normal. A 3 mm polyp was found in the transverse colon. Removed with cold biopsy forceps,. 6 mm polyp was found in the transverse colon. The polyp was removed with cold snare. 3 mm polyp was found in the descending colon. Polypectomy with cold forceps. 3 mm polyp in the sigmoid colon. Polyp removed with cold forceps. Multiple small and large-mouthed diverticula were found in the sigmoid colon. The rest of the colon otherwise was normal. Random colon biopsies obtained to evaluate for microscopic colitis.  Hemorrhoids were found during retroflexion.  2021: Pathology from Colonoscopy  F.	Colon, Random, Biopsy: No significant abnormality G.	Colon, Transverse x 2, Polypectomy: Tubular adenoma H.	Colon, Descending, Polypectomy: Tubular adenoma I.	Colon, Sigmoid, Polypectomy: Tubular adenoma  2021: Gastroenterology Follow-Up Appointment Her diarrhea has continued to improve. She does have episodes of loose bowel movements but this occurs rarely. Denies melena, hematochezia. Denies abdominal pain.

## 2021-01-28 PROBLEM — 302233006 RENAL ARTERY STENOSIS: Status: ACTIVE | Noted: 2020-09-30

## 2021-01-28 PROBLEM — 116289008 ABDOMINAL BLOATING: Status: ACTIVE | Noted: 2020-09-28

## 2021-01-28 PROBLEM — 235675006 GASTROPARESIS: Status: ACTIVE | Noted: 2020-09-28

## 2021-04-16 ENCOUNTER — APPOINTMENT (OUTPATIENT)
Dept: URBAN - NONMETROPOLITAN AREA CLINIC 45 | Age: 81
Setting detail: DERMATOLOGY
End: 2021-05-02

## 2021-04-16 DIAGNOSIS — T07XXXA INSECT BITE, NONVENOMOUS, OF OTHER, MULTIPLE, AND UNSPECIFIED SITES, WITHOUT MENTION OF INFECTION: ICD-10-CM

## 2021-04-16 DIAGNOSIS — T300 ERYTHEMA [FIRST DEGREE], UNSPECIFIED SITE: ICD-10-CM

## 2021-04-16 DIAGNOSIS — L82.0 INFLAMED SEBORRHEIC KERATOSIS: ICD-10-CM

## 2021-04-16 DIAGNOSIS — L81.7 PIGMENTED PURPURIC DERMATOSIS: ICD-10-CM

## 2021-04-16 DIAGNOSIS — I78.1 NEVUS, NON-NEOPLASTIC: ICD-10-CM

## 2021-04-16 DIAGNOSIS — L30.1 DYSHIDROSIS [POMPHOLYX]: ICD-10-CM

## 2021-04-16 DIAGNOSIS — L57.0 ACTINIC KERATOSIS: ICD-10-CM

## 2021-04-16 DIAGNOSIS — Z80.8 FAMILY HISTORY OF MALIGNANT NEOPLASM OF OTHER ORGANS OR SYSTEMS: ICD-10-CM

## 2021-04-16 DIAGNOSIS — L30.4 ERYTHEMA INTERTRIGO: ICD-10-CM

## 2021-04-16 PROBLEM — L55.1 SUNBURN OF SECOND DEGREE: Status: ACTIVE | Noted: 2021-04-16

## 2021-04-16 PROBLEM — I10 ESSENTIAL (PRIMARY) HYPERTENSION: Status: ACTIVE | Noted: 2021-04-16

## 2021-04-16 PROBLEM — M12.9 ARTHROPATHY, UNSPECIFIED: Status: ACTIVE | Noted: 2021-04-16

## 2021-04-16 PROBLEM — K21.9 GASTRO-ESOPHAGEAL REFLUX DISEASE WITHOUT ESOPHAGITIS: Status: ACTIVE | Noted: 2021-04-16

## 2021-04-16 PROBLEM — T23.121A BURN OF FIRST DEGREE OF SINGLE RIGHT FINGER (NAIL) EXCEPT THUMB, INITIAL ENCOUNTER: Status: ACTIVE | Noted: 2021-04-16

## 2021-04-16 PROBLEM — S80.862A INSECT BITE (NONVENOMOUS), LEFT LOWER LEG, INITIAL ENCOUNTER: Status: ACTIVE | Noted: 2021-04-16

## 2021-04-16 PROBLEM — L85.3 XEROSIS CUTIS: Status: ACTIVE | Noted: 2021-04-16

## 2021-04-16 PROCEDURE — 17003 DESTRUCT PREMALG LES 2-14: CPT | Mod: 59

## 2021-04-16 PROCEDURE — 17000 DESTRUCT PREMALG LESION: CPT | Mod: 59

## 2021-04-16 PROCEDURE — 17110 DESTRUCT B9 LESION 1-14: CPT

## 2021-04-16 PROCEDURE — 99213 OFFICE O/P EST LOW 20 MIN: CPT | Mod: 25

## 2021-04-16 PROCEDURE — OTHER PRESCRIPTION: OTHER

## 2021-04-16 PROCEDURE — OTHER LIQUID NITROGEN: OTHER

## 2021-04-16 PROCEDURE — OTHER TREATMENT REGIMEN: OTHER

## 2021-04-16 PROCEDURE — OTHER COUNSELING: OTHER

## 2021-04-16 PROCEDURE — OTHER REASSURANCE: OTHER

## 2021-04-16 RX ORDER — CLOBETASOL PROPIONATE 0.5 MG/G
APPLY CREAM TOPICAL BID
Qty: 1 | Refills: 3 | Status: ERX | COMMUNITY
Start: 2021-04-16

## 2021-04-16 RX ORDER — CICLOPIROX OLAMINE 7.7 MG/G
APPLY CREAM TOPICAL TWICE A DAY
Qty: 1 | Refills: 3 | Status: ERX | COMMUNITY
Start: 2021-04-16

## 2021-04-16 RX ORDER — MUPIROCIN 20 MG/G
APPLY OINTMENT TOPICAL TWICE A DAY
Qty: 1 | Refills: 3 | Status: ERX | COMMUNITY
Start: 2021-04-16

## 2021-04-16 ASSESSMENT — LOCATION DETAILED DESCRIPTION DERM
LOCATION DETAILED: RIGHT DISTAL PRETIBIAL REGION
LOCATION DETAILED: LEFT ANTERIOR DISTAL UPPER ARM
LOCATION DETAILED: NASAL TIP
LOCATION DETAILED: LEFT VENTRAL PROXIMAL FOREARM
LOCATION DETAILED: RIGHT MEDIAL ZYGOMA
LOCATION DETAILED: LEFT DISTAL PRETIBIAL REGION
LOCATION DETAILED: RIGHT PROXIMAL DORSAL INDEX FINGER
LOCATION DETAILED: LEFT LATERAL PROXIMAL PRETIBIAL REGION
LOCATION DETAILED: RIGHT PROXIMAL DORSAL FOREARM
LOCATION DETAILED: NASAL DORSUM

## 2021-04-16 ASSESSMENT — LOCATION ZONE DERM
LOCATION ZONE: LEG
LOCATION ZONE: FACE
LOCATION ZONE: NOSE
LOCATION ZONE: FINGER
LOCATION ZONE: ARM

## 2021-04-16 ASSESSMENT — LOCATION SIMPLE DESCRIPTION DERM
LOCATION SIMPLE: RIGHT FOREARM
LOCATION SIMPLE: LEFT FOREARM
LOCATION SIMPLE: LEFT PRETIBIAL REGION
LOCATION SIMPLE: RIGHT PRETIBIAL REGION
LOCATION SIMPLE: NOSE
LOCATION SIMPLE: RIGHT INDEX FINGER
LOCATION SIMPLE: LEFT UPPER ARM
LOCATION SIMPLE: RIGHT ZYGOMA

## 2021-04-16 NOTE — HPI: OTHER
Condition:: Burn
Please Describe Your Condition:: is being seen for a chief complaint of Burn . Located on the right hand. Patient burned her hand 2 weeks ago and has been applying neosporin and cannot get the spot healed.

## 2021-04-16 NOTE — PROCEDURE: TREATMENT REGIMEN
Patient called care team with concerns  No further calls were made at this time  
Detail Level: Detailed
Detail Level: Simple
Continue Regimen: Clobetasol Cream twice a day as needed for flares\\nEmollients \\nMild Cleansers\\nO'Keefes Working Hands three times a day\\nNexcare Crack Solution as needed
Plan: Clobetasol cream twice a day as needed
Continue Regimen: Change Ciclopirox TS to ciclopirox cream twice a day as needed \\nZeasorb powder\\nDrying technique
Plan: Clean twice a day with wound wash saline\\nMupirocin ointment twice a day and keep covered\\nDiscontinue Neosporin

## 2021-04-16 NOTE — PROCEDURE: LIQUID NITROGEN
Post-Care Instructions: I reviewed with the patient in detail post-care instructions. Patient is to wear sunprotection, and avoid picking at any of the treated lesions. Pt may apply Vaseline to crusted or scabbing areas.
Render Note In Bullet Format When Appropriate: No
Medical Necessity Information: It is in your best interest to select a reason for this procedure from the list below. All of these items fulfill various CMS LCD requirements except the new and changing color options.
Consent: The patient's consent was obtained including but not limited to risks of crusting, scabbing, blistering, scarring, darker or lighter pigmentary change, recurrence, incomplete removal and infection.
Duration Of Freeze Thaw-Cycle (Seconds): 0
Number Of Freeze-Thaw Cycles: 1 freeze-thaw cycle
Detail Level: Detailed
Medical Necessity Clause: This procedure was medically necessary because the lesions that were treated were:

## 2021-07-26 ENCOUNTER — OFFICE VISIT (OUTPATIENT)
Dept: URBAN - NONMETROPOLITAN AREA CLINIC 2 | Facility: CLINIC | Age: 81
End: 2021-07-26

## 2021-10-18 ENCOUNTER — APPOINTMENT (OUTPATIENT)
Dept: URBAN - NONMETROPOLITAN AREA CLINIC 45 | Age: 81
Setting detail: DERMATOLOGY
End: 2021-10-18

## 2021-10-18 DIAGNOSIS — L30.4 ERYTHEMA INTERTRIGO: ICD-10-CM

## 2021-10-18 DIAGNOSIS — L81.7 PIGMENTED PURPURIC DERMATOSIS: ICD-10-CM

## 2021-10-18 DIAGNOSIS — L82.1 OTHER SEBORRHEIC KERATOSIS: ICD-10-CM

## 2021-10-18 DIAGNOSIS — L30.1 DYSHIDROSIS [POMPHOLYX]: ICD-10-CM

## 2021-10-18 DIAGNOSIS — L82.0 INFLAMED SEBORRHEIC KERATOSIS: ICD-10-CM

## 2021-10-18 DIAGNOSIS — Z80.8 FAMILY HISTORY OF MALIGNANT NEOPLASM OF OTHER ORGANS OR SYSTEMS: ICD-10-CM

## 2021-10-18 DIAGNOSIS — L57.0 ACTINIC KERATOSIS: ICD-10-CM

## 2021-10-18 PROBLEM — L20.84 INTRINSIC (ALLERGIC) ECZEMA: Status: ACTIVE | Noted: 2021-10-18

## 2021-10-18 PROBLEM — L23.7 ALLERGIC CONTACT DERMATITIS DUE TO PLANTS, EXCEPT FOOD: Status: ACTIVE | Noted: 2021-10-18

## 2021-10-18 PROBLEM — D48.5 NEOPLASM OF UNCERTAIN BEHAVIOR OF SKIN: Status: ACTIVE | Noted: 2021-10-18

## 2021-10-18 PROBLEM — L85.3 XEROSIS CUTIS: Status: ACTIVE | Noted: 2021-10-18

## 2021-10-18 PROCEDURE — OTHER MIPS QUALITY: OTHER

## 2021-10-18 PROCEDURE — OTHER COUNSELING: OTHER

## 2021-10-18 PROCEDURE — 17000 DESTRUCT PREMALG LESION: CPT | Mod: 59

## 2021-10-18 PROCEDURE — OTHER TREATMENT REGIMEN: OTHER

## 2021-10-18 PROCEDURE — OTHER REASSURANCE: OTHER

## 2021-10-18 PROCEDURE — OTHER LIQUID NITROGEN: OTHER

## 2021-10-18 PROCEDURE — OTHER BIOPSY BY SHAVE METHOD: OTHER

## 2021-10-18 PROCEDURE — 99213 OFFICE O/P EST LOW 20 MIN: CPT | Mod: 25

## 2021-10-18 PROCEDURE — 11102 TANGNTL BX SKIN SINGLE LES: CPT

## 2021-10-18 ASSESSMENT — LOCATION DETAILED DESCRIPTION DERM
LOCATION DETAILED: LEFT MEDIAL UPPER BACK
LOCATION DETAILED: LEFT DISTAL PRETIBIAL REGION
LOCATION DETAILED: NASAL ROOT
LOCATION DETAILED: RIGHT INFERIOR UPPER BACK
LOCATION DETAILED: RIGHT DISTAL PRETIBIAL REGION

## 2021-10-18 ASSESSMENT — LOCATION SIMPLE DESCRIPTION DERM
LOCATION SIMPLE: LEFT UPPER BACK
LOCATION SIMPLE: LEFT PRETIBIAL REGION
LOCATION SIMPLE: NOSE
LOCATION SIMPLE: RIGHT UPPER BACK
LOCATION SIMPLE: RIGHT PRETIBIAL REGION

## 2021-10-18 ASSESSMENT — LOCATION ZONE DERM
LOCATION ZONE: LEG
LOCATION ZONE: NOSE
LOCATION ZONE: TRUNK

## 2021-10-18 NOTE — PROCEDURE: TREATMENT REGIMEN
Detail Level: Detailed
Continue Regimen: Ciclopirox TS to ciclopirox cream twice a day as needed \\nZeasorb powder\\nDrying technique
Continue Regimen: Clobetasol Cream twice a day as needed for flares\\nEmollients \\nMild Cleansers\\nO'Keefes Working Hands three times a day\\nNexcare Crack Solution as needed

## 2021-10-18 NOTE — PROCEDURE: BIOPSY BY SHAVE METHOD
Bill 76122 For Specimen Handling/Conveyance To Laboratory?: no
Information: Selecting Yes will display possible errors in your note based on the variables you have selected. This validation is only offered as a suggestion for you. PLEASE NOTE THAT THE VALIDATION TEXT WILL BE REMOVED WHEN YOU FINALIZE YOUR NOTE. IF YOU WANT TO FAX A PRELIMINARY NOTE YOU WILL NEED TO TOGGLE THIS TO 'NO' IF YOU DO NOT WANT IT IN YOUR FAXED NOTE.
Post-Care Instructions: I reviewed with the patient in detail post-care instructions. Patient is to keep the biopsy site dry overnight, and then apply bacitracin twice daily until healed. Patient may apply hydrogen peroxide soaks to remove any crusting.
Biopsy Method: Dermablade
X Size Of Lesion In Cm: 0
Depth Of Biopsy: dermis
Silver Nitrate Text: The wound bed was treated with silver nitrate after the biopsy was performed.
Body Location Override (Optional - Billing Will Still Be Based On Selected Body Map Location If Applicable): mid t-spine
Biopsy Type: H and E
Electrodesiccation Text: The wound bed was treated with electrodesiccation after the biopsy was performed.
Notification Instructions: Patient will be notified of biopsy results. However, patient instructed to call the office if not contacted within 2 weeks.
Dressing: bandage
Curettage Text: The wound bed was treated with curettage after the biopsy was performed.
Detail Level: Detailed
Wound Care: Petrolatum
Anesthesia Volume In Cc (Will Not Render If 0): 0.5
Billing Type: Third-Party Bill
Electrodesiccation And Curettage Text: The wound bed was treated with electrodesiccation and curettage after the biopsy was performed.
Consent: Written consent was obtained and risks were reviewed including but not limited to scarring, infection, bleeding, scabbing, incomplete removal, nerve damage and allergy to anesthesia.
Type Of Destruction Used: Curettage
Was A Bandage Applied: Yes
Anesthesia Type: 1% lidocaine with epinephrine
Cryotherapy Text: The wound bed was treated with cryotherapy after the biopsy was performed.
Hemostasis: Drysol

## 2022-04-12 ENCOUNTER — APPOINTMENT (OUTPATIENT)
Dept: URBAN - NONMETROPOLITAN AREA CLINIC 45 | Age: 82
Setting detail: DERMATOLOGY
End: 2022-04-18

## 2022-04-12 DIAGNOSIS — Z80.8 FAMILY HISTORY OF MALIGNANT NEOPLASM OF OTHER ORGANS OR SYSTEMS: ICD-10-CM

## 2022-04-12 DIAGNOSIS — I87.2 VENOUS INSUFFICIENCY (CHRONIC) (PERIPHERAL): ICD-10-CM

## 2022-04-12 DIAGNOSIS — L30.4 ERYTHEMA INTERTRIGO: ICD-10-CM

## 2022-04-12 DIAGNOSIS — L81.8 OTHER SPECIFIED DISORDERS OF PIGMENTATION: ICD-10-CM

## 2022-04-12 DIAGNOSIS — L82.1 OTHER SEBORRHEIC KERATOSIS: ICD-10-CM

## 2022-04-12 DIAGNOSIS — L30.1 DYSHIDROSIS [POMPHOLYX]: ICD-10-CM

## 2022-04-12 PROCEDURE — OTHER MIPS QUALITY: OTHER

## 2022-04-12 PROCEDURE — OTHER REASSURANCE: OTHER

## 2022-04-12 PROCEDURE — OTHER PRESCRIPTION MEDICATION MANAGEMENT: OTHER

## 2022-04-12 PROCEDURE — OTHER MONITORING: OTHER

## 2022-04-12 PROCEDURE — 99214 OFFICE O/P EST MOD 30 MIN: CPT

## 2022-04-12 PROCEDURE — OTHER COUNSELING: TOPICAL STEROIDS: OTHER

## 2022-04-12 PROCEDURE — OTHER COUNSELING: OTHER

## 2022-04-12 PROCEDURE — OTHER DIAGNOSIS COMMENT: OTHER

## 2022-04-12 ASSESSMENT — LOCATION ZONE DERM
LOCATION ZONE: LEG
LOCATION ZONE: SCALP
LOCATION ZONE: TRUNK
LOCATION ZONE: NOSE
LOCATION ZONE: FACE

## 2022-04-12 ASSESSMENT — LOCATION SIMPLE DESCRIPTION DERM
LOCATION SIMPLE: NOSE
LOCATION SIMPLE: LEFT CHEEK
LOCATION SIMPLE: LEFT UPPER BACK
LOCATION SIMPLE: LEFT PRETIBIAL REGION
LOCATION SIMPLE: RIGHT ZYGOMA
LOCATION SIMPLE: SCALP
LOCATION SIMPLE: RIGHT PRETIBIAL REGION

## 2022-04-12 ASSESSMENT — LOCATION DETAILED DESCRIPTION DERM
LOCATION DETAILED: LEFT CENTRAL PARIETAL SCALP
LOCATION DETAILED: RIGHT MEDIAL ZYGOMA
LOCATION DETAILED: RIGHT DISTAL PRETIBIAL REGION
LOCATION DETAILED: LEFT SUPERIOR PREAURICULAR CHEEK
LOCATION DETAILED: LEFT MEDIAL UPPER BACK
LOCATION DETAILED: NASAL SUPRATIP
LOCATION DETAILED: LEFT DISTAL PRETIBIAL REGION

## 2022-04-12 NOTE — PROCEDURE: MIPS QUALITY
Quality 110: Preventive Care And Screening: Influenza Immunization: Influenza immunization was not ordered or administered, reason not given
Detail Level: Detailed
Quality 431: Preventive Care And Screening: Unhealthy Alcohol Use - Screening: Patient not identified as an unhealthy alcohol user when screened for unhealthy alcohol use using a systematic screening method

## 2022-04-12 NOTE — PROCEDURE: DIAGNOSIS COMMENT
Detail Level: Simple
Render Risk Assessment In Note?: yes
Comment: Pt scratched area, states was a pimple

## 2022-04-12 NOTE — HPI: SKIN LESION
Is This A New Presentation, Or A Follow-Up?: Skin Lesion
Additional History: Pt states was larger, has gotten smaller

## 2022-04-12 NOTE — PROCEDURE: PRESCRIPTION MEDICATION MANAGEMENT
Render In Strict Bullet Format?: No
Plan: Recommend compression hose or socks daily (20-30 mmHg)\\nElevate feet when seated \\nOTC HCC 1% twice a day as needed for irritation and may use Clobetasol cream twice a day as needed for severe irritation\\nPt to follow up with nephrologist
Plan: Ciclopirox TS to ciclopirox cream twice a day as needed \\nZeasorb powder\\nDrying technique
Plan: Clobetasol Cream twice a day as needed for flares\\nEmollients \\nMild Cleansers\\nO'Keefes Working Hands three times a day\\nNexcare Crack Solution as needed
Detail Level: Zone
Detail Level: Simple

## 2022-09-05 NOTE — PROCEDURE: MIPS QUALITY
Quality 110: Preventive Care And Screening: Influenza Immunization: Influenza immunization was not ordered or administered, reason not given
HOT
Detail Level: Detailed
Quality 431: Preventive Care And Screening: Unhealthy Alcohol Use - Screening: Patient not identified as an unhealthy alcohol user when screened for unhealthy alcohol use using a systematic screening method

## 2022-10-12 ENCOUNTER — APPOINTMENT (OUTPATIENT)
Dept: URBAN - NONMETROPOLITAN AREA CLINIC 45 | Age: 82
Setting detail: DERMATOLOGY
End: 2022-10-27

## 2022-10-12 DIAGNOSIS — Z80.8 FAMILY HISTORY OF MALIGNANT NEOPLASM OF OTHER ORGANS OR SYSTEMS: ICD-10-CM

## 2022-10-12 DIAGNOSIS — L82.1 OTHER SEBORRHEIC KERATOSIS: ICD-10-CM

## 2022-10-12 DIAGNOSIS — L72.0 EPIDERMAL CYST: ICD-10-CM

## 2022-10-12 DIAGNOSIS — I87.2 VENOUS INSUFFICIENCY (CHRONIC) (PERIPHERAL): ICD-10-CM

## 2022-10-12 DIAGNOSIS — L30.1 DYSHIDROSIS [POMPHOLYX]: ICD-10-CM

## 2022-10-12 DIAGNOSIS — L30.4 ERYTHEMA INTERTRIGO: ICD-10-CM

## 2022-10-12 PROBLEM — D23.9 OTHER BENIGN NEOPLASM OF SKIN, UNSPECIFIED: Status: ACTIVE | Noted: 2022-10-12

## 2022-10-12 PROCEDURE — OTHER MIPS QUALITY: OTHER

## 2022-10-12 PROCEDURE — OTHER COUNSELING: OTHER

## 2022-10-12 PROCEDURE — OTHER PRESCRIPTION MEDICATION MANAGEMENT: OTHER

## 2022-10-12 PROCEDURE — 99214 OFFICE O/P EST MOD 30 MIN: CPT

## 2022-10-12 PROCEDURE — OTHER COUNSELING: TOPICAL STEROIDS: OTHER

## 2022-10-12 ASSESSMENT — LOCATION DETAILED DESCRIPTION DERM
LOCATION DETAILED: RIGHT DISTAL PRETIBIAL REGION
LOCATION DETAILED: LEFT CENTRAL PARIETAL SCALP
LOCATION DETAILED: LEFT DISTAL PRETIBIAL REGION
LOCATION DETAILED: RIGHT MEDIAL ZYGOMA
LOCATION DETAILED: LEFT SUPERIOR PREAURICULAR CHEEK
LOCATION DETAILED: LEFT INFERIOR MEDIAL MALAR CHEEK

## 2022-10-12 ASSESSMENT — LOCATION ZONE DERM
LOCATION ZONE: SCALP
LOCATION ZONE: LEG
LOCATION ZONE: FACE

## 2022-10-12 ASSESSMENT — LOCATION SIMPLE DESCRIPTION DERM
LOCATION SIMPLE: RIGHT PRETIBIAL REGION
LOCATION SIMPLE: LEFT CHEEK
LOCATION SIMPLE: RIGHT ZYGOMA
LOCATION SIMPLE: SCALP
LOCATION SIMPLE: LEFT PRETIBIAL REGION

## 2022-10-12 NOTE — PROCEDURE: PRESCRIPTION MEDICATION MANAGEMENT
Plan: Zeasorb powder\\nDrying technique
Detail Level: Zone
Continue Regimen: Ciclopirox TS to ciclopirox cream twice a day as needed
Render In Strict Bullet Format?: No
Plan: Recommend the following:\\nEmollients \\nMild Cleansers\\nO'Keefes Working Hands three times a day\\nNexcare Crack Solution as needed
Continue Regimen: Clobetasol Cream twice a day as needed for flares
Plan: Continue compression hose or socks daily (20-30 mmHg)\\nRecommend Elevate feet when seated \\nContinue OTC HCC 1% twice a day as needed for irritation and may use Clobetasol cream twice a day as needed for severe irritation
Detail Level: Simple

## 2023-06-27 ENCOUNTER — TELEPHONE ENCOUNTER (OUTPATIENT)
Dept: URBAN - NONMETROPOLITAN AREA CLINIC 2 | Facility: CLINIC | Age: 83
End: 2023-06-27

## 2023-06-29 ENCOUNTER — WEB ENCOUNTER (OUTPATIENT)
Dept: URBAN - NONMETROPOLITAN AREA CLINIC 2 | Facility: CLINIC | Age: 83
End: 2023-06-29

## 2023-06-29 ENCOUNTER — OFFICE VISIT (OUTPATIENT)
Dept: URBAN - NONMETROPOLITAN AREA CLINIC 2 | Facility: CLINIC | Age: 83
End: 2023-06-29
Payer: MEDICARE

## 2023-06-29 VITALS
WEIGHT: 155 LBS | BODY MASS INDEX: 25.83 KG/M2 | TEMPERATURE: 98.4 F | SYSTOLIC BLOOD PRESSURE: 171 MMHG | HEART RATE: 66 BPM | HEIGHT: 65 IN | DIASTOLIC BLOOD PRESSURE: 101 MMHG

## 2023-06-29 DIAGNOSIS — I70.1 RENAL ARTERY STENOSIS: ICD-10-CM

## 2023-06-29 DIAGNOSIS — R14.0 ABDOMINAL BLOATING: ICD-10-CM

## 2023-06-29 DIAGNOSIS — R19.7 DIARRHEA: ICD-10-CM

## 2023-06-29 DIAGNOSIS — Z86.010 PERSONAL HISTORY OF COLON POLYPS: ICD-10-CM

## 2023-06-29 DIAGNOSIS — K31.84 GASTROPARESIS: ICD-10-CM

## 2023-06-29 PROBLEM — 428283002 HISTORY OF POLYP OF COLON: Status: ACTIVE | Noted: 2021-01-28

## 2023-06-29 PROCEDURE — 99214 OFFICE O/P EST MOD 30 MIN: CPT | Performed by: NURSE PRACTITIONER

## 2023-06-29 RX ORDER — AMITRIPTYLINE HYDROCHLORIDE 10 MG/1
1 TABLET AT BEDTIME TABLET, FILM COATED ORAL ONCE A DAY
Qty: 90 TABLET | Refills: 3 | OUTPATIENT
Start: 2023-06-29

## 2023-10-19 ENCOUNTER — OFFICE VISIT (OUTPATIENT)
Dept: URBAN - NONMETROPOLITAN AREA CLINIC 2 | Facility: CLINIC | Age: 83
End: 2023-10-19
Payer: MEDICARE

## 2023-10-19 VITALS
DIASTOLIC BLOOD PRESSURE: 95 MMHG | WEIGHT: 155 LBS | BODY MASS INDEX: 25.83 KG/M2 | HEIGHT: 65 IN | SYSTOLIC BLOOD PRESSURE: 177 MMHG | HEART RATE: 95 BPM | TEMPERATURE: 98.7 F

## 2023-10-19 DIAGNOSIS — I70.1 RENAL ARTERY STENOSIS: ICD-10-CM

## 2023-10-19 DIAGNOSIS — R19.7 DIARRHEA: ICD-10-CM

## 2023-10-19 DIAGNOSIS — Z86.010 PERSONAL HISTORY OF COLON POLYPS: ICD-10-CM

## 2023-10-19 DIAGNOSIS — K31.84 GASTROPARESIS: ICD-10-CM

## 2023-10-19 DIAGNOSIS — R14.0 ABDOMINAL BLOATING: ICD-10-CM

## 2023-10-19 PROBLEM — 62315008 DIARRHEA: Status: ACTIVE | Noted: 2020-09-28

## 2023-10-19 PROCEDURE — 99214 OFFICE O/P EST MOD 30 MIN: CPT | Performed by: NURSE PRACTITIONER

## 2023-10-19 RX ORDER — AMITRIPTYLINE HYDROCHLORIDE 10 MG/1
1 TABLET AT BEDTIME TABLET, FILM COATED ORAL ONCE A DAY
Qty: 90 TABLET | Refills: 3 | Status: ACTIVE | COMMUNITY
Start: 2023-06-29

## 2023-10-19 NOTE — HPI-TODAY'S VISIT:
2020: Initial Gastroenterology Clinic Visit   Ms. Lujan is a 79 year old female with past medical history of total abdominal hysterectomy (), cholecystectomy, renal artery stenosis s/p stent currently on clopidogrel, gastroparesis diagnosed in  based on gastric emptying study, history of lymphocytic colitis diagnosed based on flexible sigmoidoscopy in  who presents for evaluation of diarrhea.    Ms. Lujan had a history of diarrhea dating back to  at which point she was diagnosed with lymphocytic colitis. She was treated with budesonide with improvement of his symptoms. She did not experience significant diarrhea for over several years. Two months prior to her visit, she has noted a change in her bowel habits. She experiences increased frequency of bowel movements which she describes as loose. She received rifaximin with her PCP which led to mild improvement of symptoms.   She was subsequently diagnosed with cystitis and was prescribed amoxicillin-clavulanic acid which led to exacerbation of diarrhea. Since that time, she has had fecal urgency as well as rectal pressure. She denies weight loss, nausea, vomiting.   Prior Laboratory Evaluation: 2020: 2020: CBC, chemistry panel, LFTs normal. Stool culture negative. Giardia negative. TTG-IgA <2, serum IgA low at 58.   Prior Imagin2008: Gastric Emptying Study Impression: 1. Prolonged gastric emptying.  2. The stomach is not particularly distended in appearance and there is small bowel activity increasing over the course of the examination. This would argue against gastric outlet obstruction and would raise the possiblity of abnormal motility.   2009: MRCP Impression: Prominent common bile duct is within normal limits considering prior cholecystectomy. There is no filling defect in the duct or lesion in the head of the pancreas.   10/16/2009: Abdominal Ultrasound 1. Normal response of the common bile duct following fatty meal. 2. Tiny hepatic and right renal cysts.  Prior Endoscopic Evaluation:  2021: EGD The examined esophagus was normal. The Z-line was found 36 cm from the incisors. A small hiatal hernia was present. Multiple 5 to 15 mm sessile polyps with no stigmata of recent bleeding were found in the stomach. Biopsied for sampling.  Patchy erythematous mucosa without bleeding was found in the gastric antrum. Biopsied. Two 2 to 4 mm sessile polyps were found in the gastric body. The polyps were removed with a cold biopsy forceps.  Nodular mucosa was found in the gastric fundus. Biopsied. The cardia and gastric fundus were normal on retroflexion/. The examined duodenum was normal. Biopsied.  2021: Pathology from EGD A.	Duodenum, Biopsy: No significant abnormality. B.	Stomach, Antrum, Biopsy: No significant abnormality. C.	Stomach, Biopsy: Fundic gland polyp. D.	Stomach, Fundus, Biopsy: Fundic gland polyp E.	Stomach, Polypectomy: Fundic gland polyp. 2021: Colonoscopy  Findings: The perianal and digital rectal examinations were normal.  The terminal ileum appeared normal. A 3 mm polyp was found in the transverse colon. Removed with cold biopsy forceps,. 6 mm polyp was found in the transverse colon. The polyp was removed with cold snare. 3 mm polyp was found in the descending colon. Polypectomy with cold forceps. 3 mm polyp in the sigmoid colon. Polyp removed with cold forceps. Multiple small and large-mouthed diverticula were found in the sigmoid colon. The rest of the colon otherwise was normal. Random colon biopsies obtained to evaluate for microscopic colitis.  Hemorrhoids were found during retroflexion.  2021: Pathology from Colonoscopy  F.	Colon, Random, Biopsy: No significant abnormality G.	Colon, Transverse x 2, Polypectomy: Tubular adenoma H.	Colon, Descending, Polypectomy: Tubular adenoma I.	Colon, Sigmoid, Polypectomy: Tubular adenoma  2021: Gastroenterology Follow-Up Appointment Her diarrhea has continued to improve. She does have episodes of loose bowel movements but this occurs rarely. Denies melena, hematochezia. Denies abdominal pain.  2023 Bonnie presents for follow-up of irritable bowel syndrome with diarrhea predominance.  Since her last visit she continues to complain of multiple loose urgent bowel movements.  She had an extensive work-up that is negative.  She feels that fiber makes her symptoms worse.  She does have bloating along with a history of gastroparesis.  Her gastroparesis symptoms have been stable on low fat and fiber meals.  Today we have discussed her work-up.  I want her to start amitriptyline 10 mg at night and Florastor daily.  MB  10/19/2023 Bonnie presents for follow-up of gastroparesis and IBS-D.  She is doing amazing on amitriptyline 10 mg at night.  She has not having any significant breakthrough diarrhea.  Her nausea is well controlled.  Today she is doing great with no new GI complaints.  MB

## 2023-11-09 ENCOUNTER — APPOINTMENT (OUTPATIENT)
Dept: URBAN - NONMETROPOLITAN AREA CLINIC 45 | Age: 83
Setting detail: DERMATOLOGY
End: 2023-11-09

## 2023-11-09 DIAGNOSIS — T300 BLISTERS, EPIDERMAL LOSS [SECOND DEGREE], UNSPECIFIED SITE: ICD-10-CM

## 2023-11-09 DIAGNOSIS — L82.1 OTHER SEBORRHEIC KERATOSIS: ICD-10-CM

## 2023-11-09 DIAGNOSIS — L82.0 INFLAMED SEBORRHEIC KERATOSIS: ICD-10-CM

## 2023-11-09 DIAGNOSIS — L57.8 OTHER SKIN CHANGES DUE TO CHRONIC EXPOSURE TO NONIONIZING RADIATION: ICD-10-CM

## 2023-11-09 DIAGNOSIS — L57.0 ACTINIC KERATOSIS: ICD-10-CM

## 2023-11-09 PROBLEM — T23.201A BURN OF SECOND DEGREE OF RIGHT HAND, UNSPECIFIED SITE, INITIAL ENCOUNTER: Status: ACTIVE | Noted: 2023-11-09

## 2023-11-09 PROCEDURE — 17000 DESTRUCT PREMALG LESION: CPT | Mod: 59

## 2023-11-09 PROCEDURE — OTHER MIPS QUALITY: OTHER

## 2023-11-09 PROCEDURE — OTHER SUNSCREEN RECOMMENDATIONS: OTHER

## 2023-11-09 PROCEDURE — OTHER PRESCRIPTION: OTHER

## 2023-11-09 PROCEDURE — 17110 DESTRUCT B9 LESION 1-14: CPT

## 2023-11-09 PROCEDURE — 99213 OFFICE O/P EST LOW 20 MIN: CPT | Mod: 25

## 2023-11-09 PROCEDURE — OTHER MEDICATION COUNSELING: OTHER

## 2023-11-09 PROCEDURE — OTHER COUNSELING: OTHER

## 2023-11-09 PROCEDURE — OTHER PRESCRIPTION MEDICATION MANAGEMENT: OTHER

## 2023-11-09 PROCEDURE — OTHER LIQUID NITROGEN: OTHER

## 2023-11-09 RX ORDER — DOXYCYCLINE 100 MG/1
PO CAPSULE ORAL BID
Qty: 14 | Refills: 0 | Status: ERX | COMMUNITY
Start: 2023-11-09

## 2023-11-09 ASSESSMENT — LOCATION DETAILED DESCRIPTION DERM
LOCATION DETAILED: RIGHT ULNAR DORSAL HAND
LOCATION DETAILED: NASAL ROOT
LOCATION DETAILED: LEFT SUPERIOR LATERAL MIDBACK
LOCATION DETAILED: LEFT RIB CAGE
LOCATION DETAILED: LEFT SUPERIOR CENTRAL MALAR CHEEK
LOCATION DETAILED: RIGHT UPPER CUTANEOUS LIP
LOCATION DETAILED: LEFT INFERIOR CENTRAL MALAR CHEEK

## 2023-11-09 ASSESSMENT — LOCATION ZONE DERM
LOCATION ZONE: FACE
LOCATION ZONE: NOSE
LOCATION ZONE: TRUNK
LOCATION ZONE: HAND
LOCATION ZONE: LIP

## 2023-11-09 ASSESSMENT — LOCATION SIMPLE DESCRIPTION DERM
LOCATION SIMPLE: LEFT LOWER BACK
LOCATION SIMPLE: RIGHT LIP
LOCATION SIMPLE: RIGHT HAND
LOCATION SIMPLE: NOSE
LOCATION SIMPLE: ABDOMEN
LOCATION SIMPLE: LEFT CHEEK

## 2023-11-09 NOTE — PROCEDURE: PRESCRIPTION MEDICATION MANAGEMENT
Render In Strict Bullet Format?: No
Plan: Continue Mupirocin ointment twice a day, cover with a bandaid \\nDoxycycline monohydrate 100 mg twice a day x7 days with food
Detail Level: Simple

## 2023-11-09 NOTE — PROCEDURE: MEDICATION COUNSELING
Received a phone call from Marisa HERNANDEZ Case Management. She has been trying for months to get a hospital bed for patient without success. Patient told her that Dr. Dakota Hughes would send an order for one if patient decided she wanted one. Hira Tyson is hoping we can work together to get bed for patient. Dr. Dino Louisape you order a hospital bed for Patient? Rituxan Pregnancy And Lactation Text: This medication is Pregnancy Category C and it isn't know if it is safe during pregnancy. It is unknown if this medication is excreted in breast milk but similar antibodies are known to be excreted.
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
## 2023-11-09 NOTE — PROCEDURE: LIQUID NITROGEN
Show Topical Anesthesia Variable?: Yes
Post-Care Instructions: I reviewed with the patient in detail post-care instructions. Patient is to wear sunprotection, and avoid picking at any of the treated lesions. Pt may apply Vaseline to crusted or scabbing areas.
Include Z78.9 (Other Specified Conditions Influencing Health Status) As An Associated Diagnosis?: No
Consent: The patient's verbal consent was obtained including but not limited to risks of crusting, scabbing, blistering, scarring, darker or lighter pigmentary change, recurrence, incomplete removal and infection.
Spray Paint Text: The liquid nitrogen was applied to the skin utilizing a spray paint frosting technique.
Medical Necessity Clause: This procedure was medically necessary because the lesions that were treated were:
Number Of Freeze-Thaw Cycles: 1 freeze-thaw cycle
Medical Necessity Information: It is in your best interest to select a reason for this procedure from the list below. All of these items fulfill various CMS LCD requirements except the new and changing color options.
Detail Level: Detailed
Duration Of Freeze Thaw-Cycle (Seconds): 0

## 2023-11-09 NOTE — PROCEDURE: MEDICATION COUNSELING
Incubation Time (Set To 00:00:00 If Not Wanted): 01:15:00 Calcipotriene Counseling:  I discussed with the patient the risks of calcipotriene including but not limited to erythema, scaling, itching, and irritation.

## 2023-11-09 NOTE — PROCEDURE: MEDICATION COUNSELING
Normal Cosentyx Counseling:  I discussed with the patient the risks of Cosentyx including but not limited to worsening of Crohn's disease, immunosuppression, allergic reactions and infections.  The patient understands that monitoring is required including a PPD at baseline and must alert us or the primary physician if symptoms of infection or other concerning signs are noted.

## 2024-04-04 ENCOUNTER — OV EP (OUTPATIENT)
Dept: URBAN - NONMETROPOLITAN AREA CLINIC 2 | Facility: CLINIC | Age: 84
End: 2024-04-04

## 2024-04-11 ENCOUNTER — OV EP (OUTPATIENT)
Dept: URBAN - NONMETROPOLITAN AREA CLINIC 2 | Facility: CLINIC | Age: 84
End: 2024-04-11
Payer: MEDICARE

## 2024-04-11 VITALS
SYSTOLIC BLOOD PRESSURE: 170 MMHG | DIASTOLIC BLOOD PRESSURE: 83 MMHG | HEART RATE: 76 BPM | TEMPERATURE: 98 F | BODY MASS INDEX: 25.83 KG/M2 | HEIGHT: 65 IN | WEIGHT: 155 LBS

## 2024-04-11 DIAGNOSIS — I70.1 RENAL ARTERY STENOSIS: ICD-10-CM

## 2024-04-11 DIAGNOSIS — Z86.010 PERSONAL HISTORY OF COLON POLYPS: ICD-10-CM

## 2024-04-11 DIAGNOSIS — K31.84 GASTROPARESIS: ICD-10-CM

## 2024-04-11 DIAGNOSIS — R14.0 ABDOMINAL BLOATING: ICD-10-CM

## 2024-04-11 DIAGNOSIS — R19.7 DIARRHEA: ICD-10-CM

## 2024-04-11 PROCEDURE — 99214 OFFICE O/P EST MOD 30 MIN: CPT | Performed by: NURSE PRACTITIONER

## 2024-04-11 RX ORDER — AMITRIPTYLINE HYDROCHLORIDE 10 MG/1
1 TABLET AT BEDTIME TABLET, FILM COATED ORAL ONCE A DAY
Qty: 90 TABLET | Refills: 3 | OUTPATIENT
Start: 2024-04-11

## 2024-04-11 RX ORDER — AMITRIPTYLINE HYDROCHLORIDE 10 MG/1
1 TABLET AT BEDTIME TABLET, FILM COATED ORAL ONCE A DAY
Qty: 90 TABLET | Refills: 3 | Status: ACTIVE | COMMUNITY
Start: 2023-06-29

## 2024-04-11 NOTE — HPI-TODAY'S VISIT:
2020: Initial Gastroenterology Clinic Visit   Ms. Lujan is a 79 year old female with past medical history of total abdominal hysterectomy (), cholecystectomy, renal artery stenosis s/p stent currently on clopidogrel, gastroparesis diagnosed in  based on gastric emptying study, history of lymphocytic colitis diagnosed based on flexible sigmoidoscopy in  who presents for evaluation of diarrhea.    Ms. Lujan had a history of diarrhea dating back to  at which point she was diagnosed with lymphocytic colitis. She was treated with budesonide with improvement of his symptoms. She did not experience significant diarrhea for over several years. Two months prior to her visit, she has noted a change in her bowel habits. She experiences increased frequency of bowel movements which she describes as loose. She received rifaximin with her PCP which led to mild improvement of symptoms.   She was subsequently diagnosed with cystitis and was prescribed amoxicillin-clavulanic acid which led to exacerbation of diarrhea. Since that time, she has had fecal urgency as well as rectal pressure. She denies weight loss, nausea, vomiting.   Prior Laboratory Evaluation: 2020: 2020: CBC, chemistry panel, LFTs normal. Stool culture negative. Giardia negative. TTG-IgA <2, serum IgA low at 58.   Prior Imagin2008: Gastric Emptying Study Impression: 1. Prolonged gastric emptying.  2. The stomach is not particularly distended in appearance and there is small bowel activity increasing over the course of the examination. This would argue against gastric outlet obstruction and would raise the possiblity of abnormal motility.   2009: MRCP Impression: Prominent common bile duct is within normal limits considering prior cholecystectomy. There is no filling defect in the duct or lesion in the head of the pancreas.   10/16/2009: Abdominal Ultrasound 1. Normal response of the common bile duct following fatty meal. 2. Tiny hepatic and right renal cysts.  Prior Endoscopic Evaluation:  2021: EGD The examined esophagus was normal. The Z-line was found 36 cm from the incisors. A small hiatal hernia was present. Multiple 5 to 15 mm sessile polyps with no stigmata of recent bleeding were found in the stomach. Biopsied for sampling.  Patchy erythematous mucosa without bleeding was found in the gastric antrum. Biopsied. Two 2 to 4 mm sessile polyps were found in the gastric body. The polyps were removed with a cold biopsy forceps.  Nodular mucosa was found in the gastric fundus. Biopsied. The cardia and gastric fundus were normal on retroflexion/. The examined duodenum was normal. Biopsied.  2021: Pathology from EGD A.	Duodenum, Biopsy: No significant abnormality. B.	Stomach, Antrum, Biopsy: No significant abnormality. C.	Stomach, Biopsy: Fundic gland polyp. D.	Stomach, Fundus, Biopsy: Fundic gland polyp E.	Stomach, Polypectomy: Fundic gland polyp. 2021: Colonoscopy  Findings: The perianal and digital rectal examinations were normal.  The terminal ileum appeared normal. A 3 mm polyp was found in the transverse colon. Removed with cold biopsy forceps,. 6 mm polyp was found in the transverse colon. The polyp was removed with cold snare. 3 mm polyp was found in the descending colon. Polypectomy with cold forceps. 3 mm polyp in the sigmoid colon. Polyp removed with cold forceps. Multiple small and large-mouthed diverticula were found in the sigmoid colon. The rest of the colon otherwise was normal. Random colon biopsies obtained to evaluate for microscopic colitis.  Hemorrhoids were found during retroflexion.  2021: Pathology from Colonoscopy  F.	Colon, Random, Biopsy: No significant abnormality G.	Colon, Transverse x 2, Polypectomy: Tubular adenoma H.	Colon, Descending, Polypectomy: Tubular adenoma I.	Colon, Sigmoid, Polypectomy: Tubular adenoma  2021: Gastroenterology Follow-Up Appointment Her diarrhea has continued to improve. She does have episodes of loose bowel movements but this occurs rarely. Denies melena, hematochezia. Denies abdominal pain.  2023 Bonnie presents for follow-up of irritable bowel syndrome with diarrhea predominance.  Since her last visit she continues to complain of multiple loose urgent bowel movements.  She had an extensive work-up that is negative.  She feels that fiber makes her symptoms worse.  She does have bloating along with a history of gastroparesis.  Her gastroparesis symptoms have been stable on low fat and fiber meals.  Today we have discussed her work-up.  I want her to start amitriptyline 10 mg at night and Florastor daily.  MB  10/19/2023 Bonnie presents for follow-up of gastroparesis and IBS-D.  She is doing amazing on amitriptyline 10 mg at night.  She has not having any significant breakthrough diarrhea.  Her nausea is well controlled.  Today she is doing great with no new GI complaints.  MB  2024 Bonnie presents for follow-up of IBS-D. She is doing great on amitriptyline 10 mg nightly. She still has occasional diarrhea if she eats a salad out. She is taking Florastor daily. She agrees to use Imodium as needed. Today she denies any new GI complaints. MB

## 2024-06-06 NOTE — PROCEDURE: REASSURANCE
SCRIBE #1 NOTE: I, Laly Henley, am scribing for, and in the presence of, Yannick Dhillon MD. I have scribed the entire note.       History     Chief Complaint   Patient presents with    Headache    Vomiting     Pt c/o generalized HA w/ light sensitivity & N/V x1hr. Hx of HTN & DM, noncompliant w/ lisinopril d/t decrease appetite from [facility].  in triage     Review of patient's allergies indicates:   Allergen Reactions    Hydrocodone Itching         History of Present Illness     HPI    6/6/2024, 6:47 AM  History obtained from the patient      History of Present Illness: Laurel Dale is a 39 y.o. female patient with a PMHx of HTN, DM, and GERD who presents to the Emergency Department for evaluation of HA which onset 1 hr PTA. Pt reports generalized HA. Symptoms are constant and moderate in severity. No mitigating or exacerbating factors reported. Associated sxs include photophobia and n/v. Pt notes she is noncompliant with lisinopril d/t decreased appetite from [facility]. No further complaints or concerns at this time.       Arrival mode: by private vehicle.    PCP: No, Primary Doctor        Past Medical History:  Past Medical History:   Diagnosis Date    Diabetes mellitus     Hypertension        Past Surgical History:  Past Surgical History:   Procedure Laterality Date    TUBAL LIGATION           Family History:  Family History   Problem Relation Name Age of Onset    Diabetes Maternal Grandmother         Social History:  Social History     Tobacco Use    Smoking status: Never    Smokeless tobacco: Never   Substance and Sexual Activity    Alcohol use: Not Currently    Drug use: Never    Sexual activity: Not Currently     Partners: Male     Birth control/protection: Abstinence, None        Review of Systems     Review of Systems   Constitutional: Negative.    HENT: Negative.     Eyes:  Positive for photophobia.   Respiratory: Negative.     Cardiovascular: Negative.    Gastrointestinal:  Positive for nausea 
"and vomiting.   Endocrine: Negative.    Genitourinary: Negative.    Musculoskeletal: Negative.    Skin: Negative.    Allergic/Immunologic: Negative.    Neurological:  Positive for headaches (generalized).   Hematological: Negative.    Psychiatric/Behavioral: Negative.     All other systems reviewed and are negative.       Physical Exam     Initial Vitals [06/06/24 0458]   BP Pulse Resp Temp SpO2   (!) 165/98 101 19 98.3 °F (36.8 °C) 100 %      MAP       --          Physical Exam  Nursing Notes and Vital Signs Reviewed.   Constitutional: Patient is in no acute distress. Well-developed and well-nourished.  Head: Atraumatic. Normocephalic.  Eyes: PERRL. EOM intact. Conjunctivae are not pale. No scleral icterus.  ENT: Mucous membranes are moist. Oropharynx is clear and symmetric.    Neck: Supple. Full ROM. No lymphadenopathy.  Cardiovascular: Regular rate. Regular rhythm. No murmurs, rubs, or gallops. Distal pulses are 2+ and symmetric.  Pulmonary/Chest: No respiratory distress. Clear to auscultation bilaterally. No wheezing or rales.  Abdominal: Soft and non-distended.  There is no tenderness.  No rebound, guarding, or rigidity. Good bowel sounds.  Genitourinary: No CVA tenderness  Musculoskeletal: Moves all extremities. No obvious deformities. No edema. No calf tenderness.  Skin: Warm and dry.  Neurological:  Alert, awake, and appropriate.  Normal speech.  No acute focal neurological deficits are appreciated.  Psychiatric: Normal affect. Good eye contact. Appropriate in content.     ED Course   Procedures  ED Vital Signs:  Vitals:    06/06/24 0456 06/06/24 0458 06/06/24 0830   BP:  (!) 165/98 (!) 159/88   Pulse:  101 101   Resp:  19 18   Temp:  98.3 °F (36.8 °C)    TempSrc:  Oral    SpO2:  100% 100%   Weight: 78 kg (172 lb)     Height: 5' 1" (1.549 m)         Abnormal Lab Results:  Labs Reviewed   CBC W/ AUTO DIFFERENTIAL - Abnormal; Notable for the following components:       Result Value    Hemoglobin 11.7 (*)     "
Hematocrit 36.2 (*)     MCH 26.7 (*)     All other components within normal limits   COMPREHENSIVE METABOLIC PANEL - Abnormal; Notable for the following components:    CO2 21 (*)     Glucose 141 (*)     All other components within normal limits   POCT GLUCOSE - Abnormal; Notable for the following components:    POCT Glucose 134 (*)     All other components within normal limits   POCT GLUCOSE MONITORING CONTINUOUS        All Lab Results:  Results for orders placed or performed during the hospital encounter of 06/06/24   CBC Auto Differential   Result Value Ref Range    WBC 6.46 3.90 - 12.70 K/uL    RBC 4.38 4.00 - 5.40 M/uL    Hemoglobin 11.7 (L) 12.0 - 16.0 g/dL    Hematocrit 36.2 (L) 37.0 - 48.5 %    MCV 83 82 - 98 fL    MCH 26.7 (L) 27.0 - 31.0 pg    MCHC 32.3 32.0 - 36.0 g/dL    RDW 12.3 11.5 - 14.5 %    Platelets 318 150 - 450 K/uL    MPV 9.4 9.2 - 12.9 fL    Immature Granulocytes 0.2 0.0 - 0.5 %    Gran # (ANC) 3.1 1.8 - 7.7 K/uL    Immature Grans (Abs) 0.01 0.00 - 0.04 K/uL    Lymph # 2.6 1.0 - 4.8 K/uL    Mono # 0.7 0.3 - 1.0 K/uL    Eos # 0.1 0.0 - 0.5 K/uL    Baso # 0.02 0.00 - 0.20 K/uL    nRBC 0 0 /100 WBC    Gran % 47.3 38.0 - 73.0 %    Lymph % 40.9 18.0 - 48.0 %    Mono % 10.1 4.0 - 15.0 %    Eosinophil % 1.2 0.0 - 8.0 %    Basophil % 0.3 0.0 - 1.9 %    Differential Method Automated    Comprehensive Metabolic Panel   Result Value Ref Range    Sodium 138 136 - 145 mmol/L    Potassium 3.5 3.5 - 5.1 mmol/L    Chloride 106 95 - 110 mmol/L    CO2 21 (L) 23 - 29 mmol/L    Glucose 141 (H) 70 - 110 mg/dL    BUN 9 6 - 20 mg/dL    Creatinine 0.9 0.5 - 1.4 mg/dL    Calcium 9.4 8.7 - 10.5 mg/dL    Total Protein 7.4 6.0 - 8.4 g/dL    Albumin 3.9 3.5 - 5.2 g/dL    Total Bilirubin 0.4 0.1 - 1.0 mg/dL    Alkaline Phosphatase 84 55 - 135 U/L    AST 19 10 - 40 U/L    ALT 16 10 - 44 U/L    eGFR >60 >60 mL/min/1.73 m^2    Anion Gap 11 8 - 16 mmol/L   POCT glucose   Result Value Ref Range    POCT Glucose 134 (H) 70 - 110 
mg/dL       Imaging Results:  Imaging Results    None                  The Emergency Provider reviewed the vital signs and test results, which are outlined above.     ED Discussion       8:29 AM: Reassessed pt at this time. Discussed with pt all pertinent ED information and results. Discussed pt dx and plan of tx. Gave pt all f/u and return to the ED instructions. All questions and concerns were addressed at this time. Pt expresses understanding of information and instructions, and is comfortable with plan to discharge. Pt is stable for discharge.    I discussed with patient and/or family/caretaker that evaluation in the ED does not suggest any emergent or life threatening medical conditions requiring immediate intervention beyond what was provided in the ED, and I believe patient is safe for discharge.  Regardless, an unremarkable evaluation in the ED does not preclude the development or presence of a serious of life threatening condition. As such, patient was instructed to return immediately for any worsening or change in current symptoms.         Medical Decision Making  DDx: headache, migraine    Amount and/or Complexity of Data Reviewed  Labs: ordered. Decision-making details documented in ED Course.  Discussion of management or test interpretation with external provider(s): Feeling better after treatment in the ED, and ready to go home.     Risk  Prescription drug management.                ED Medication(s):  Medications   sodium chloride 0.9% bolus 1,000 mL 1,000 mL (0 mLs Intravenous Stopped 6/6/24 0827)   butalbital-acetaminophen-caffeine -40 mg per tablet 1 tablet (1 tablet Oral Given 6/6/24 0819)   diphenhydrAMINE injection 25 mg (25 mg Intravenous Given 6/6/24 0820)   prochlorperazine injection Soln 10 mg (10 mg Intravenous Given 6/6/24 0820)       New Prescriptions    BUTALBITAL-ACETAMINOPHEN-CAFFEINE -40 MG (FIORICET, ESGIC) -40 MG PER TABLET    Take 1 tablet by mouth every 4 (four) 
hours as needed for Pain.    PROCHLORPERAZINE (COMPAZINE) 10 MG TABLET    Take 1 tablet (10 mg total) by mouth every 6 (six) hours as needed.        Follow-up Information       Schedule an appointment as soon as possible for a visit  with The [address]    Specialty: Internal Medicine  Contact information:  [id number] [facility] [id number] [phone]  Additional information:  Please park on the [address] side and use the Clinic entrance. Check in on the 2nd floor, to the right.                               Scribe Attestation:   Scribe #1: I performed the above scribed service and the documentation accurately describes the services I performed. I attest to the accuracy of the note.     Attending:   Physician Attestation Statement for Scribe #1: I, Yannick Dhillon MD, personally performed the services described in this documentation, as scribed by Laly Henley, in my presence, and it is both accurate and complete.           Clinical Impression       ICD-10-CM ICD-9-CM   1. Nonintractable headache, unspecified chronicity pattern, unspecified headache type  R51.9 784.0       Disposition:   Disposition: Discharged  Condition: Stable         Yannick Dhillon MD  06/06/24 0852    
Detail Level: Simple
Hide Additional Notes?: No

## 2024-09-04 ENCOUNTER — P2P PATIENT RECORD (OUTPATIENT)
Age: 84
End: 2024-09-04

## 2025-01-09 ENCOUNTER — DASHBOARD ENCOUNTERS (OUTPATIENT)
Age: 85
End: 2025-01-09

## 2025-01-09 ENCOUNTER — OFFICE VISIT (OUTPATIENT)
Dept: URBAN - NONMETROPOLITAN AREA CLINIC 2 | Facility: CLINIC | Age: 85
End: 2025-01-09
Payer: MEDICARE

## 2025-01-09 VITALS
DIASTOLIC BLOOD PRESSURE: 82 MMHG | WEIGHT: 157 LBS | HEART RATE: 79 BPM | BODY MASS INDEX: 26.16 KG/M2 | HEIGHT: 65 IN | SYSTOLIC BLOOD PRESSURE: 178 MMHG

## 2025-01-09 DIAGNOSIS — R14.0 ABDOMINAL BLOATING: ICD-10-CM

## 2025-01-09 DIAGNOSIS — K31.84 GASTROPARESIS: ICD-10-CM

## 2025-01-09 DIAGNOSIS — Z86.0101 PERSONAL HISTORY OF ADENOMATOUS AND SERRATED COLON POLYPS: ICD-10-CM

## 2025-01-09 DIAGNOSIS — R19.7 DIARRHEA: ICD-10-CM

## 2025-01-09 DIAGNOSIS — I70.1 RENAL ARTERY STENOSIS: ICD-10-CM

## 2025-01-09 PROBLEM — 428283002: Status: ACTIVE | Noted: 2025-01-09

## 2025-01-09 PROCEDURE — 99214 OFFICE O/P EST MOD 30 MIN: CPT | Performed by: NURSE PRACTITIONER

## 2025-01-09 RX ORDER — AMITRIPTYLINE HYDROCHLORIDE 10 MG/1
1 TABLET AT BEDTIME TABLET, FILM COATED ORAL ONCE A DAY
Qty: 90 TABLET | Refills: 3 | Status: ACTIVE | COMMUNITY
Start: 2023-06-29

## 2025-01-09 RX ORDER — AMITRIPTYLINE HYDROCHLORIDE 10 MG/1
1 TABLET AT BEDTIME TABLET, FILM COATED ORAL ONCE A DAY
Qty: 90 TABLET | Refills: 3 | Status: ACTIVE | COMMUNITY
Start: 2024-04-11

## 2025-01-09 NOTE — HPI-TODAY'S VISIT:
2020: Initial Gastroenterology Clinic Visit   Ms. Lujan is a 79 year old female with past medical history of total abdominal hysterectomy (), cholecystectomy, renal artery stenosis s/p stent currently on clopidogrel, gastroparesis diagnosed in  based on gastric emptying study, history of lymphocytic colitis diagnosed based on flexible sigmoidoscopy in  who presents for evaluation of diarrhea.    Ms. Lujan had a history of diarrhea dating back to  at which point she was diagnosed with lymphocytic colitis. She was treated with budesonide with improvement of his symptoms. She did not experience significant diarrhea for over several years. Two months prior to her visit, she has noted a change in her bowel habits. She experiences increased frequency of bowel movements which she describes as loose. She received rifaximin with her PCP which led to mild improvement of symptoms.   She was subsequently diagnosed with cystitis and was prescribed amoxicillin-clavulanic acid which led to exacerbation of diarrhea. Since that time, she has had fecal urgency as well as rectal pressure. She denies weight loss, nausea, vomiting.   Prior Laboratory Evaluation: 2020: 2020: CBC, chemistry panel, LFTs normal. Stool culture negative. Giardia negative. TTG-IgA <2, serum IgA low at 58.   Prior Imagin2008: Gastric Emptying Study Impression: 1. Prolonged gastric emptying.  2. The stomach is not particularly distended in appearance and there is small bowel activity increasing over the course of the examination. This would argue against gastric outlet obstruction and would raise the possiblity of abnormal motility.   2009: MRCP Impression: Prominent common bile duct is within normal limits considering prior cholecystectomy. There is no filling defect in the duct or lesion in the head of the pancreas.   10/16/2009: Abdominal Ultrasound 1. Normal response of the common bile duct following fatty meal. 2. Tiny hepatic and right renal cysts.  Prior Endoscopic Evaluation:  2021: EGD The examined esophagus was normal. The Z-line was found 36 cm from the incisors. A small hiatal hernia was present. Multiple 5 to 15 mm sessile polyps with no stigmata of recent bleeding were found in the stomach. Biopsied for sampling.  Patchy erythematous mucosa without bleeding was found in the gastric antrum. Biopsied. Two 2 to 4 mm sessile polyps were found in the gastric body. The polyps were removed with a cold biopsy forceps.  Nodular mucosa was found in the gastric fundus. Biopsied. The cardia and gastric fundus were normal on retroflexion/. The examined duodenum was normal. Biopsied.  2021: Pathology from EGD A.	Duodenum, Biopsy: No significant abnormality. B.	Stomach, Antrum, Biopsy: No significant abnormality. C.	Stomach, Biopsy: Fundic gland polyp. D.	Stomach, Fundus, Biopsy: Fundic gland polyp E.	Stomach, Polypectomy: Fundic gland polyp. 2021: Colonoscopy  Findings: The perianal and digital rectal examinations were normal.  The terminal ileum appeared normal. A 3 mm polyp was found in the transverse colon. Removed with cold biopsy forceps,. 6 mm polyp was found in the transverse colon. The polyp was removed with cold snare. 3 mm polyp was found in the descending colon. Polypectomy with cold forceps. 3 mm polyp in the sigmoid colon. Polyp removed with cold forceps. Multiple small and large-mouthed diverticula were found in the sigmoid colon. The rest of the colon otherwise was normal. Random colon biopsies obtained to evaluate for microscopic colitis.  Hemorrhoids were found during retroflexion.  2021: Pathology from Colonoscopy  F.	Colon, Random, Biopsy: No significant abnormality G.	Colon, Transverse x 2, Polypectomy: Tubular adenoma H.	Colon, Descending, Polypectomy: Tubular adenoma I.	Colon, Sigmoid, Polypectomy: Tubular adenoma  2021: Gastroenterology Follow-Up Appointment Her diarrhea has continued to improve. She does have episodes of loose bowel movements but this occurs rarely. Denies melena, hematochezia. Denies abdominal pain.  2023 Bonnie presents for follow-up of irritable bowel syndrome with diarrhea predominance.  Since her last visit she continues to complain of multiple loose urgent bowel movements.  She had an extensive work-up that is negative.  She feels that fiber makes her symptoms worse.  She does have bloating along with a history of gastroparesis.  Her gastroparesis symptoms have been stable on low fat and fiber meals.  Today we have discussed her work-up.  I want her to start amitriptyline 10 mg at night and Florastor daily.  MB  10/19/2023 Bonnie presents for follow-up of gastroparesis and IBS-D.  She is doing amazing on amitriptyline 10 mg at night.  She has not having any significant breakthrough diarrhea.  Her nausea is well controlled.  Today she is doing great with no new GI complaints.  MB  2024 Bonnie presents for follow-up of IBS-D. She is doing great on amitriptyline 10 mg nightly. She still has occasional diarrhea if she eats a salad out. She is taking Florastor daily. She agrees to use Imodium as needed. Today she denies any new GI complaints. MB 2025 Bonnie presents for follow-up.  She is doing great on amitriptyline 10 mg nightly and Florastor daily.  She is recently had a flare of diarrhea with up to 4-6 loose urgent bowel movements daily particularly after meals.  We discussed that she can use Imodium before meals, I want her to increase her Florastor to twice daily.  Will check stool studies and try course of Xifaxan for IBS-D.  If no relief consider a  trial of colestipol 1 g twice daily.  MB

## 2025-01-21 ENCOUNTER — TELEPHONE ENCOUNTER (OUTPATIENT)
Dept: URBAN - METROPOLITAN AREA CLINIC 5 | Facility: CLINIC | Age: 85
End: 2025-01-21

## 2025-01-22 LAB
CALPROTECTIN, FECAL: 53
CLOSTRIDIUM DIFFICILE: (no result)
GIARDIA AG, EIA, STOOL: (no result)
LEUKOCYTES: (no result)
OVA AND PARASITES, CONC AND PERM SMEAR: (no result)
SALMONELLA AND SHIGELLA, CULTURE: (no result)
SHIGA TOXINS, EIA W/RFL TO E.COLI O157 CULTURE: (no result)

## 2025-01-23 ENCOUNTER — TELEPHONE ENCOUNTER (OUTPATIENT)
Dept: URBAN - NONMETROPOLITAN AREA CLINIC 2 | Facility: CLINIC | Age: 85
End: 2025-01-23

## 2025-02-10 ENCOUNTER — TELEPHONE ENCOUNTER (OUTPATIENT)
Dept: URBAN - NONMETROPOLITAN AREA CLINIC 2 | Facility: CLINIC | Age: 85
End: 2025-02-10

## 2025-03-28 ENCOUNTER — TELEPHONE ENCOUNTER (OUTPATIENT)
Dept: URBAN - NONMETROPOLITAN AREA CLINIC 2 | Facility: CLINIC | Age: 85
End: 2025-03-28

## 2025-03-28 RX ORDER — AMITRIPTYLINE HYDROCHLORIDE 10 MG/1
1 TABLET AT BEDTIME TABLET, FILM COATED ORAL ONCE A DAY
Qty: 90 TABLET | Refills: 3
Start: 2023-06-29

## 2025-05-01 ENCOUNTER — OFFICE VISIT (OUTPATIENT)
Dept: URBAN - NONMETROPOLITAN AREA CLINIC 2 | Facility: CLINIC | Age: 85
End: 2025-05-01
Payer: MEDICARE

## 2025-05-01 DIAGNOSIS — R19.7 DIARRHEA: ICD-10-CM

## 2025-05-01 DIAGNOSIS — R14.0 ABDOMINAL BLOATING: ICD-10-CM

## 2025-05-01 DIAGNOSIS — I70.1 RENAL ARTERY STENOSIS: ICD-10-CM

## 2025-05-01 DIAGNOSIS — Z86.0101 PERSONAL HISTORY OF ADENOMATOUS AND SERRATED COLON POLYPS: ICD-10-CM

## 2025-05-01 DIAGNOSIS — K31.84 GASTROPARESIS: ICD-10-CM

## 2025-05-01 PROCEDURE — 99214 OFFICE O/P EST MOD 30 MIN: CPT | Performed by: NURSE PRACTITIONER

## 2025-05-01 RX ORDER — HYDROCHLOROTHIAZIDE 25 MG/1
1 TABLET IN THE MORNING TABLET ORAL ONCE A DAY
Status: ACTIVE | COMMUNITY

## 2025-05-01 RX ORDER — AMITRIPTYLINE HYDROCHLORIDE 10 MG/1
1 TABLET AT BEDTIME TABLET, FILM COATED ORAL ONCE A DAY
Qty: 90 TABLET | Refills: 3 | Status: ACTIVE | COMMUNITY
Start: 2024-04-11

## 2025-05-01 RX ORDER — AMITRIPTYLINE HYDROCHLORIDE 10 MG/1
1 TABLET AT BEDTIME TABLET, FILM COATED ORAL ONCE A DAY
Qty: 90 TABLET | Refills: 3 | Status: ACTIVE | COMMUNITY
Start: 2023-06-29

## 2025-05-01 NOTE — HPI-TODAY'S VISIT:
2020: Initial Gastroenterology Clinic Visit   Ms. Lujan is a 79 year old female with past medical history of total abdominal hysterectomy (), cholecystectomy, renal artery stenosis s/p stent currently on clopidogrel, gastroparesis diagnosed in  based on gastric emptying study, history of lymphocytic colitis diagnosed based on flexible sigmoidoscopy in  who presents for evaluation of diarrhea.    Ms. Lujan had a history of diarrhea dating back to  at which point she was diagnosed with lymphocytic colitis. She was treated with budesonide with improvement of his symptoms. She did not experience significant diarrhea for over several years. Two months prior to her visit, she has noted a change in her bowel habits. She experiences increased frequency of bowel movements which she describes as loose. She received rifaximin with her PCP which led to mild improvement of symptoms.   She was subsequently diagnosed with cystitis and was prescribed amoxicillin-clavulanic acid which led to exacerbation of diarrhea. Since that time, she has had fecal urgency as well as rectal pressure. She denies weight loss, nausea, vomiting.   Prior Laboratory Evaluation: 2020: 2020: CBC, chemistry panel, LFTs normal. Stool culture negative. Giardia negative. TTG-IgA <2, serum IgA low at 58.   Prior Imagin2008: Gastric Emptying Study Impression: 1. Prolonged gastric emptying.  2. The stomach is not particularly distended in appearance and there is small bowel activity increasing over the course of the examination. This would argue against gastric outlet obstruction and would raise the possiblity of abnormal motility.   2009: MRCP Impression: Prominent common bile duct is within normal limits considering prior cholecystectomy. There is no filling defect in the duct or lesion in the head of the pancreas.   10/16/2009: Abdominal Ultrasound 1. Normal response of the common bile duct following fatty meal. 2. Tiny hepatic and right renal cysts.  Prior Endoscopic Evaluation:  2021: EGD The examined esophagus was normal. The Z-line was found 36 cm from the incisors. A small hiatal hernia was present. Multiple 5 to 15 mm sessile polyps with no stigmata of recent bleeding were found in the stomach. Biopsied for sampling.  Patchy erythematous mucosa without bleeding was found in the gastric antrum. Biopsied. Two 2 to 4 mm sessile polyps were found in the gastric body. The polyps were removed with a cold biopsy forceps.  Nodular mucosa was found in the gastric fundus. Biopsied. The cardia and gastric fundus were normal on retroflexion/. The examined duodenum was normal. Biopsied.  2021: Pathology from EGD A.	Duodenum, Biopsy: No significant abnormality. B.	Stomach, Antrum, Biopsy: No significant abnormality. C.	Stomach, Biopsy: Fundic gland polyp. D.	Stomach, Fundus, Biopsy: Fundic gland polyp E.	Stomach, Polypectomy: Fundic gland polyp. 2021: Colonoscopy  Findings: The perianal and digital rectal examinations were normal.  The terminal ileum appeared normal. A 3 mm polyp was found in the transverse colon. Removed with cold biopsy forceps,. 6 mm polyp was found in the transverse colon. The polyp was removed with cold snare. 3 mm polyp was found in the descending colon. Polypectomy with cold forceps. 3 mm polyp in the sigmoid colon. Polyp removed with cold forceps. Multiple small and large-mouthed diverticula were found in the sigmoid colon. The rest of the colon otherwise was normal. Random colon biopsies obtained to evaluate for microscopic colitis.  Hemorrhoids were found during retroflexion.  2021: Pathology from Colonoscopy  F.	Colon, Random, Biopsy: No significant abnormality G.	Colon, Transverse x 2, Polypectomy: Tubular adenoma H.	Colon, Descending, Polypectomy: Tubular adenoma I.	Colon, Sigmoid, Polypectomy: Tubular adenoma  2021: Gastroenterology Follow-Up Appointment Her diarrhea has continued to improve. She does have episodes of loose bowel movements but this occurs rarely. Denies melena, hematochezia. Denies abdominal pain.  2023 Bonnie presents for follow-up of irritable bowel syndrome with diarrhea predominance.  Since her last visit she continues to complain of multiple loose urgent bowel movements.  She had an extensive work-up that is negative.  She feels that fiber makes her symptoms worse.  She does have bloating along with a history of gastroparesis.  Her gastroparesis symptoms have been stable on low fat and fiber meals.  Today we have discussed her work-up.  I want her to start amitriptyline 10 mg at night and Florastor daily.  MB  10/19/2023 Bonnie presents for follow-up of gastroparesis and IBS-D.  She is doing amazing on amitriptyline 10 mg at night.  She has not having any significant breakthrough diarrhea.  Her nausea is well controlled.  Today she is doing great with no new GI complaints.  MB  2024 Bonnie presents for follow-up of IBS-D. She is doing great on amitriptyline 10 mg nightly. She still has occasional diarrhea if she eats a salad out. She is taking Florastor daily. She agrees to use Imodium as needed. Today she denies any new GI complaints. MB 2025 Bonnie presents for follow-up.  She is doing great on amitriptyline 10 mg nightly and Florastor daily.  She is recently had a flare of diarrhea with up to 4-6 loose urgent bowel movements daily particularly after meals.  We discussed that she can use Imodium before meals, I want her to increase her Florastor to twice daily.  Will check stool studies and try course of Xifaxan for IBS-D.  If no relief consider a  trial of colestipol 1 g twice daily.  MB 2025 Bonnie presents for follow-up.  Since her last visit she is actually doing much better.  She is down to 1-3 soft bowel movements most days.  She is on amitriptyline 10 mg at night and status post Afaxin with improvement in her symptoms.  She is using Imodium just as needed.  We have discussed adding Creon versus colestipol if no improvement.  Today she feels good in her symptoms.  MB